# Patient Record
Sex: FEMALE | Race: WHITE | NOT HISPANIC OR LATINO | Employment: UNEMPLOYED
[De-identification: names, ages, dates, MRNs, and addresses within clinical notes are randomized per-mention and may not be internally consistent; named-entity substitution may affect disease eponyms.]

---

## 2017-04-26 ENCOUNTER — HOSPITAL (OUTPATIENT)
Dept: OTHER | Age: 37
End: 2017-04-26
Attending: EMERGENCY MEDICINE

## 2017-06-06 ENCOUNTER — HOSPITAL (OUTPATIENT)
Dept: OTHER | Age: 37
End: 2017-06-06
Attending: FAMILY MEDICINE

## 2019-07-22 ENCOUNTER — HOSPITAL ENCOUNTER (INPATIENT)
Facility: HOSPITAL | Age: 39
LOS: 1 days | Discharge: HOME | DRG: 313 | End: 2019-07-23
Attending: EMERGENCY MEDICINE | Admitting: HOSPITALIST
Payer: MEDICARE

## 2019-07-22 DIAGNOSIS — G89.4 CHRONIC PAIN SYNDROME: ICD-10-CM

## 2019-07-22 DIAGNOSIS — E86.0 DEHYDRATION: ICD-10-CM

## 2019-07-22 DIAGNOSIS — R07.9 CHEST PAIN: Primary | ICD-10-CM

## 2019-07-22 PROBLEM — R94.31 ABNORMAL EKG: Status: ACTIVE | Noted: 2019-07-22

## 2019-07-22 PROBLEM — E87.6 HYPOKALEMIA: Status: ACTIVE | Noted: 2019-07-22

## 2019-07-22 PROBLEM — D72.829 LEUKOCYTOSIS: Status: ACTIVE | Noted: 2019-07-22

## 2019-07-22 PROBLEM — I95.9 HYPOTENSION: Status: ACTIVE | Noted: 2019-07-22

## 2019-07-22 PROBLEM — F17.200 NICOTINE DEPENDENCE: Status: ACTIVE | Noted: 2019-07-22

## 2019-07-22 PROBLEM — F41.9 ANXIETY DISORDER: Status: ACTIVE | Noted: 2019-07-22

## 2019-07-22 LAB
ALBUMIN SERPL BCP-MCNC: 4 G/DL (ref 3.5–5.2)
ALP SERPL-CCNC: 46 U/L (ref 55–135)
ALT SERPL W/O P-5'-P-CCNC: 9 U/L (ref 10–44)
ANION GAP SERPL CALC-SCNC: 8 MMOL/L (ref 8–16)
AST SERPL-CCNC: 11 U/L (ref 10–40)
B-HCG UR QL: NEGATIVE
BASOPHILS # BLD AUTO: 0.05 K/UL (ref 0–0.2)
BASOPHILS NFR BLD: 0.2 % (ref 0–1.9)
BILIRUB SERPL-MCNC: 0.2 MG/DL (ref 0.1–1)
BILIRUB UR QL STRIP: NEGATIVE
BNP SERPL-MCNC: 18 PG/ML (ref 0–99)
BUN SERPL-MCNC: 12 MG/DL (ref 6–20)
CALCIUM SERPL-MCNC: 9.3 MG/DL (ref 8.7–10.5)
CHLORIDE SERPL-SCNC: 109 MMOL/L (ref 95–110)
CLARITY UR: ABNORMAL
CO2 SERPL-SCNC: 23 MMOL/L (ref 23–29)
COLOR UR: YELLOW
CREAT SERPL-MCNC: 0.8 MG/DL (ref 0.5–1.4)
CRP SERPL-MCNC: 0.4 MG/L (ref 0–8.2)
D DIMER PPP IA.FEU-MCNC: <0.19 MG/L FEU
DIFFERENTIAL METHOD: ABNORMAL
EOSINOPHIL # BLD AUTO: 0.2 K/UL (ref 0–0.5)
EOSINOPHIL NFR BLD: 0.9 % (ref 0–8)
ERYTHROCYTE [DISTWIDTH] IN BLOOD BY AUTOMATED COUNT: 12.9 % (ref 11.5–14.5)
ERYTHROCYTE [SEDIMENTATION RATE] IN BLOOD BY WESTERGREN METHOD: 2 MM/HR (ref 0–20)
EST. GFR  (AFRICAN AMERICAN): >60 ML/MIN/1.73 M^2
EST. GFR  (NON AFRICAN AMERICAN): >60 ML/MIN/1.73 M^2
GLUCOSE SERPL-MCNC: 95 MG/DL (ref 70–110)
GLUCOSE UR QL STRIP: NEGATIVE
HCT VFR BLD AUTO: 43.7 % (ref 37–48.5)
HGB BLD-MCNC: 14.5 G/DL (ref 12–16)
HGB UR QL STRIP: NEGATIVE
IMM GRANULOCYTES # BLD AUTO: 0.08 K/UL (ref 0–0.04)
KETONES UR QL STRIP: NEGATIVE
LEUKOCYTE ESTERASE UR QL STRIP: NEGATIVE
LIPASE SERPL-CCNC: 15 U/L (ref 4–60)
LYMPHOCYTES # BLD AUTO: 3 K/UL (ref 1–4.8)
LYMPHOCYTES NFR BLD: 14.5 % (ref 18–48)
MAGNESIUM SERPL-MCNC: 2 MG/DL (ref 1.6–2.6)
MCH RBC QN AUTO: 29.8 PG (ref 27–31)
MCHC RBC AUTO-ENTMCNC: 33.2 G/DL (ref 32–36)
MCV RBC AUTO: 90 FL (ref 82–98)
MONOCYTES # BLD AUTO: 1 K/UL (ref 0.3–1)
MONOCYTES NFR BLD: 4.8 % (ref 4–15)
NEUTROPHILS # BLD AUTO: 16.2 K/UL (ref 1.8–7.7)
NEUTROPHILS NFR BLD: 79.2 % (ref 38–73)
NITRITE UR QL STRIP: NEGATIVE
NRBC BLD-RTO: 0 /100 WBC
PH UR STRIP: 8 [PH] (ref 5–8)
PLATELET # BLD AUTO: 231 K/UL (ref 150–350)
PMV BLD AUTO: 11.6 FL (ref 9.2–12.9)
POTASSIUM SERPL-SCNC: 3.5 MMOL/L (ref 3.5–5.1)
PROT SERPL-MCNC: 6.3 G/DL (ref 6–8.4)
PROT UR QL STRIP: NEGATIVE
RBC # BLD AUTO: 4.87 M/UL (ref 4–5.4)
SODIUM SERPL-SCNC: 140 MMOL/L (ref 136–145)
SP GR UR STRIP: 1.01 (ref 1–1.03)
TROPONIN I SERPL DL<=0.01 NG/ML-MCNC: 0.01 NG/ML (ref 0–0.03)
TROPONIN I SERPL DL<=0.01 NG/ML-MCNC: 0.01 NG/ML (ref 0–0.03)
TROPONIN I SERPL DL<=0.01 NG/ML-MCNC: <0.006 NG/ML (ref 0–0.03)
URN SPEC COLLECT METH UR: ABNORMAL
UROBILINOGEN UR STRIP-ACNC: 1 EU/DL
WBC # BLD AUTO: 20.48 K/UL (ref 3.9–12.7)

## 2019-07-22 PROCEDURE — 99285 EMERGENCY DEPT VISIT HI MDM: CPT | Mod: 25

## 2019-07-22 PROCEDURE — 94761 N-INVAS EAR/PLS OXIMETRY MLT: CPT

## 2019-07-22 PROCEDURE — 85651 RBC SED RATE NONAUTOMATED: CPT

## 2019-07-22 PROCEDURE — 96374 THER/PROPH/DIAG INJ IV PUSH: CPT

## 2019-07-22 PROCEDURE — 25000003 PHARM REV CODE 250: Performed by: NURSE PRACTITIONER

## 2019-07-22 PROCEDURE — 81025 URINE PREGNANCY TEST: CPT

## 2019-07-22 PROCEDURE — 83735 ASSAY OF MAGNESIUM: CPT

## 2019-07-22 PROCEDURE — 93005 ELECTROCARDIOGRAM TRACING: CPT

## 2019-07-22 PROCEDURE — G0378 HOSPITAL OBSERVATION PER HR: HCPCS

## 2019-07-22 PROCEDURE — 85379 FIBRIN DEGRADATION QUANT: CPT

## 2019-07-22 PROCEDURE — 36415 COLL VENOUS BLD VENIPUNCTURE: CPT

## 2019-07-22 PROCEDURE — 25000003 PHARM REV CODE 250: Performed by: EMERGENCY MEDICINE

## 2019-07-22 PROCEDURE — 84484 ASSAY OF TROPONIN QUANT: CPT | Mod: 91

## 2019-07-22 PROCEDURE — 63600175 PHARM REV CODE 636 W HCPCS: Performed by: EMERGENCY MEDICINE

## 2019-07-22 PROCEDURE — 63600175 PHARM REV CODE 636 W HCPCS: Performed by: NURSE PRACTITIONER

## 2019-07-22 PROCEDURE — 83880 ASSAY OF NATRIURETIC PEPTIDE: CPT

## 2019-07-22 PROCEDURE — 80053 COMPREHEN METABOLIC PANEL: CPT

## 2019-07-22 PROCEDURE — 81003 URINALYSIS AUTO W/O SCOPE: CPT

## 2019-07-22 PROCEDURE — 25000003 PHARM REV CODE 250

## 2019-07-22 PROCEDURE — 83690 ASSAY OF LIPASE: CPT

## 2019-07-22 PROCEDURE — 85025 COMPLETE CBC W/AUTO DIFF WBC: CPT

## 2019-07-22 PROCEDURE — 86140 C-REACTIVE PROTEIN: CPT

## 2019-07-22 RX ORDER — CLONAZEPAM 1 MG/1
1 TABLET ORAL 2 TIMES DAILY
Status: DISCONTINUED | OUTPATIENT
Start: 2019-07-22 | End: 2019-07-23 | Stop reason: HOSPADM

## 2019-07-22 RX ORDER — TOPIRAMATE 100 MG/1
400 TABLET, FILM COATED ORAL NIGHTLY
COMMUNITY

## 2019-07-22 RX ORDER — KETOROLAC TROMETHAMINE 30 MG/ML
15 INJECTION, SOLUTION INTRAMUSCULAR; INTRAVENOUS ONCE
Status: COMPLETED | OUTPATIENT
Start: 2019-07-22 | End: 2019-07-22

## 2019-07-22 RX ORDER — PANTOPRAZOLE SODIUM 40 MG/1
40 TABLET, DELAYED RELEASE ORAL DAILY
Status: DISCONTINUED | OUTPATIENT
Start: 2019-07-22 | End: 2019-07-23 | Stop reason: HOSPADM

## 2019-07-22 RX ORDER — TOPIRAMATE 100 MG/1
100 TABLET, FILM COATED ORAL DAILY
COMMUNITY
End: 2019-07-30 | Stop reason: ALTCHOICE

## 2019-07-22 RX ORDER — ONDANSETRON 2 MG/ML
4 INJECTION INTRAMUSCULAR; INTRAVENOUS EVERY 8 HOURS PRN
Status: DISCONTINUED | OUTPATIENT
Start: 2019-07-22 | End: 2019-07-22

## 2019-07-22 RX ORDER — NITROGLYCERIN 0.4 MG/1
0.4 TABLET SUBLINGUAL EVERY 5 MIN PRN
Status: DISCONTINUED | OUTPATIENT
Start: 2019-07-22 | End: 2019-07-23 | Stop reason: HOSPADM

## 2019-07-22 RX ORDER — OXYCODONE HYDROCHLORIDE 30 MG/1
30 TABLET ORAL EVERY 4 HOURS PRN
Status: DISCONTINUED | OUTPATIENT
Start: 2019-07-22 | End: 2019-07-23 | Stop reason: HOSPADM

## 2019-07-22 RX ORDER — NALOXONE HCL 0.4 MG/ML
0.4 VIAL (ML) INJECTION
Status: DISCONTINUED | OUTPATIENT
Start: 2019-07-22 | End: 2019-07-23 | Stop reason: HOSPADM

## 2019-07-22 RX ORDER — SODIUM CHLORIDE 0.9 % (FLUSH) 0.9 %
10 SYRINGE (ML) INJECTION
Status: DISCONTINUED | OUTPATIENT
Start: 2019-07-22 | End: 2019-07-23 | Stop reason: HOSPADM

## 2019-07-22 RX ORDER — NITROGLYCERIN 0.4 MG/1
0.4 TABLET SUBLINGUAL EVERY 5 MIN PRN
Status: DISCONTINUED | OUTPATIENT
Start: 2019-07-22 | End: 2019-07-22

## 2019-07-22 RX ORDER — MORPHINE SULFATE 4 MG/ML
4 INJECTION, SOLUTION INTRAMUSCULAR; INTRAVENOUS
Status: ACTIVE | OUTPATIENT
Start: 2019-07-22 | End: 2019-07-22

## 2019-07-22 RX ORDER — SODIUM CHLORIDE 9 MG/ML
INJECTION, SOLUTION INTRAVENOUS ONCE
Status: COMPLETED | OUTPATIENT
Start: 2019-07-22 | End: 2019-07-22

## 2019-07-22 RX ORDER — ASPIRIN 325 MG
325 TABLET ORAL
Status: COMPLETED | OUTPATIENT
Start: 2019-07-22 | End: 2019-07-22

## 2019-07-22 RX ORDER — POTASSIUM CHLORIDE 20 MEQ/1
40 TABLET, EXTENDED RELEASE ORAL ONCE
Status: COMPLETED | OUTPATIENT
Start: 2019-07-22 | End: 2019-07-22

## 2019-07-22 RX ORDER — TOPIRAMATE 100 MG/1
100 TABLET, FILM COATED ORAL DAILY
Status: DISCONTINUED | OUTPATIENT
Start: 2019-07-23 | End: 2019-07-23 | Stop reason: HOSPADM

## 2019-07-22 RX ORDER — OXYCODONE HYDROCHLORIDE 15 MG/1
30 TABLET ORAL EVERY 4 HOURS PRN
Status: ON HOLD | COMMUNITY
End: 2019-07-23 | Stop reason: SDUPTHER

## 2019-07-22 RX ORDER — AMOXICILLIN 250 MG
1 CAPSULE ORAL 2 TIMES DAILY PRN
Status: DISCONTINUED | OUTPATIENT
Start: 2019-07-22 | End: 2019-07-23 | Stop reason: HOSPADM

## 2019-07-22 RX ORDER — CLONAZEPAM 1 MG/1
1 TABLET ORAL 2 TIMES DAILY
COMMUNITY

## 2019-07-22 RX ORDER — SODIUM CHLORIDE 9 MG/ML
INJECTION, SOLUTION INTRAVENOUS CONTINUOUS
Status: DISCONTINUED | OUTPATIENT
Start: 2019-07-22 | End: 2019-07-23 | Stop reason: HOSPADM

## 2019-07-22 RX ORDER — OXYCODONE HYDROCHLORIDE 30 MG/1
30 TABLET, FILM COATED, EXTENDED RELEASE ORAL EVERY 12 HOURS
COMMUNITY

## 2019-07-22 RX ORDER — ACETAMINOPHEN 325 MG/1
650 TABLET ORAL EVERY 6 HOURS PRN
Status: DISCONTINUED | OUTPATIENT
Start: 2019-07-22 | End: 2019-07-23 | Stop reason: HOSPADM

## 2019-07-22 RX ORDER — TOPIRAMATE 100 MG/1
300 TABLET, FILM COATED ORAL NIGHTLY
Status: DISCONTINUED | OUTPATIENT
Start: 2019-07-22 | End: 2019-07-23 | Stop reason: HOSPADM

## 2019-07-22 RX ORDER — ACETAMINOPHEN 325 MG/1
650 TABLET ORAL EVERY 6 HOURS PRN
Status: DISCONTINUED | OUTPATIENT
Start: 2019-07-22 | End: 2019-07-22

## 2019-07-22 RX ORDER — ONDANSETRON 2 MG/ML
4 INJECTION INTRAMUSCULAR; INTRAVENOUS EVERY 4 HOURS PRN
Status: DISCONTINUED | OUTPATIENT
Start: 2019-07-22 | End: 2019-07-23 | Stop reason: HOSPADM

## 2019-07-22 RX ORDER — ASPIRIN 325 MG
325 TABLET ORAL DAILY
Status: DISCONTINUED | OUTPATIENT
Start: 2019-07-23 | End: 2019-07-23 | Stop reason: HOSPADM

## 2019-07-22 RX ADMIN — NITROGLYCERIN 0.4 MG: 0.4 TABLET, ORALLY DISINTEGRATING SUBLINGUAL at 06:07

## 2019-07-22 RX ADMIN — NITROGLYCERIN 0.4 MG: 0.4 TABLET, ORALLY DISINTEGRATING SUBLINGUAL at 07:07

## 2019-07-22 RX ADMIN — SODIUM CHLORIDE: 0.9 INJECTION, SOLUTION INTRAVENOUS at 01:07

## 2019-07-22 RX ADMIN — PANTOPRAZOLE SODIUM 40 MG: 40 TABLET, DELAYED RELEASE ORAL at 10:07

## 2019-07-22 RX ADMIN — LIDOCAINE HYDROCHLORIDE: 20 SOLUTION ORAL; TOPICAL at 05:07

## 2019-07-22 RX ADMIN — ASPIRIN 325 MG ORAL TABLET 325 MG: 325 PILL ORAL at 05:07

## 2019-07-22 RX ADMIN — KETOROLAC TROMETHAMINE 15 MG: 30 INJECTION, SOLUTION INTRAMUSCULAR; INTRAVENOUS at 10:07

## 2019-07-22 RX ADMIN — SODIUM CHLORIDE 500 ML: 0.9 INJECTION, SOLUTION INTRAVENOUS at 10:07

## 2019-07-22 RX ADMIN — TOPIRAMATE 300 MG: 100 TABLET, FILM COATED ORAL at 10:07

## 2019-07-22 RX ADMIN — SODIUM CHLORIDE: 0.9 INJECTION, SOLUTION INTRAVENOUS at 10:07

## 2019-07-22 RX ADMIN — POTASSIUM CHLORIDE 40 MEQ: 20 TABLET, EXTENDED RELEASE ORAL at 10:07

## 2019-07-22 NOTE — [facility] SECONDARY REVIEW
Physician Advisor Internal    Level of Care Issue    Approved Inpatient for admit 7/22/19 per prosper hammonds md reviewer.

## 2019-07-22 NOTE — ED NOTES
Pt presents with complaints of chest pain that is mid chest and radiates down a little. Pt denies any shortness of breath. Pain is described as sharp and started appx a little over an hour ago. Pt initially thought maybe it was indigestion and vomited times one with no relief. No diaphoresis. Pain level is 7/10. Lungs clear to auscultation. Pt denies any cardiac history and denies any recent illnesses. Pt alert and oriented with neuro intact.

## 2019-07-22 NOTE — SUBJECTIVE & OBJECTIVE
Past Medical History:   Diagnosis Date    Mass     couple masses on left occipital    Migraine        Past Surgical History:   Procedure Laterality Date    APPENDECTOMY       SECTION      KNEE SURGERY Bilateral     had growths bilateral knees removed, some internal/some external beneign    TONSILLECTOMY         Review of patient's allergies indicates:   Allergen Reactions    Vasopressin analogues Other (See Comments)     Constricts chest then pass out       No current facility-administered medications on file prior to encounter.      Current Outpatient Medications on File Prior to Encounter   Medication Sig    clonazePAM (KLONOPIN) 1 MG tablet Take 1 mg by mouth 2 (two) times daily.    erenumab-aooe (AIMOVIG AUTOINJECTOR, 2 PACK,) 70 mg/mL injection Inject 70 mg into the skin every 28 days.    oxyCODONE (OXYCONTIN) 30 mg TR12 12 hr tablet Take 30 mg by mouth every 12 (twelve) hours.    oxyCODONE (ROXICODONE) 15 MG Tab Take 30 mg by mouth every 4 (four) hours as needed for Pain.    topiramate (TOPAMAX) 100 MG tablet Take 100 mg by mouth once daily.    topiramate (TOPAMAX) 100 MG tablet Take 300 mg by mouth every evening.     Family History     Problem Relation (Age of Onset)    Cancer Mother, Maternal Grandmother    Cerebral palsy Brother    Hypertension Mother    No Known Problems Father, Brother        Tobacco Use    Smoking status: Current Some Day Smoker     Packs/day: 0.50    Smokeless tobacco: Never Used   Substance and Sexual Activity    Alcohol use: Not Currently    Drug use: Never    Sexual activity: Not on file     Review of Systems   Constitutional: Positive for activity change, appetite change and fatigue. Negative for chills and fever.   HENT: Negative for ear discharge, ear pain, facial swelling and hearing loss.         Patient reports chronic bilateral temple headache daily that radiates to the bottom of her neck.   Eyes: Negative for pain and redness.   Respiratory:  Negative for cough and shortness of breath.    Cardiovascular: Positive for chest pain. Negative for palpitations and leg swelling.        Patient reports she woke up this morning around 4:00 a.m. With severe left-sided hip chest discomfort being sharp or stabbing light of 7 to 8/10.  Patient reports increased pain with deep inspiration and changes in position.      Gastrointestinal: Positive for nausea. Negative for abdominal distention, abdominal pain, blood in stool, constipation, diarrhea and vomiting.   Endocrine: Negative for polydipsia and polyphagia.   Genitourinary: Negative for difficulty urinating, dysuria, flank pain and hematuria.   Musculoskeletal: Negative for gait problem, neck pain and neck stiffness.   Skin: Negative for color change.   Allergic/Immunologic: Negative for food allergies.   Neurological: Negative for seizures, syncope, facial asymmetry, speech difficulty and weakness.        Leg cramps   Hematological: Does not bruise/bleed easily.   Psychiatric/Behavioral: Negative for agitation, behavioral problems, confusion, hallucinations and suicidal ideas. The patient is not nervous/anxious.      Objective:     Vital Signs (Most Recent):  Temp: 98.7 °F (37.1 °C) (07/22/19 1050)  Pulse: 67 (07/22/19 1050)  Resp: 18 (07/22/19 1050)  BP: (!) 103/54 (07/22/19 1050)  SpO2: 98 % (07/22/19 1050) Vital Signs (24h Range):  Temp:  [98.6 °F (37 °C)-98.7 °F (37.1 °C)] 98.7 °F (37.1 °C)  Pulse:  [] 67  Resp:  [16-21] 18  SpO2:  [96 %-100 %] 98 %  BP: ()/(51-63) 103/54     Weight: 49.9 kg (110 lb)  Body mass index is 21.48 kg/m².    Physical Exam   Constitutional: She is oriented to person, place, and time. She appears well-developed and well-nourished. No distress.   Sleepy but arousable   HENT:   Head: Normocephalic and atraumatic.   Eyes: Pupils are equal, round, and reactive to light. Conjunctivae and EOM are normal. Right eye exhibits no discharge. Left eye exhibits no discharge.   Neck:  Normal range of motion. Neck supple. No JVD present.   Cardiovascular: Normal rate, regular rhythm, normal heart sounds and intact distal pulses.   No murmur heard.  Pulmonary/Chest: Effort normal and breath sounds normal. No respiratory distress. She has no wheezes.   Abdominal: Soft. Bowel sounds are normal. She exhibits no distension. There is no tenderness. There is no guarding.   Genitourinary:   Genitourinary Comments: Not examined   Musculoskeletal: Normal range of motion. She exhibits no edema.   Neurological: She is alert and oriented to person, place, and time. No cranial nerve deficit.   Skin: Skin is warm and dry. Capillary refill takes less than 2 seconds. She is not diaphoretic.   Psychiatric: She has a normal mood and affect. Her behavior is normal. Judgment and thought content normal.         CRANIAL NERVES     CN III, IV, VI   Pupils are equal, round, and reactive to light.  Extraocular motions are normal.        Significant Labs: Reviewed    Significant Imaging: Reviewed

## 2019-07-22 NOTE — H&P
[facility] Hospital Medicine  History & Physical    Patient Name: Ivanna Mary  MRN: [id number]  Admission Date: 2019  Attending Physician: VIRGIL Haque  Primary Care Provider: Primary Doctor No    Patient information was obtained from patient, Father at bedside and ER records.     Subjective:     Principal Problem:Chest pain    Chief Complaint:   Chief Complaint   Patient presents with    Chest Pain     x 1 hour        HPI: This is a 39-year-old woman with a history of anxiety disorder, migraine headache, rheumatoid arthritis, and tobacco abuse presents emergency department for evaluation of sudden onset of constant substernal chest pain without radiation and with associated nausea that awoke her from sleep.  The symptoms have lasted approximately 1.5 hr in her still ongoing.  She has no associated shortness of breath.  Does not take birth control.  Has no family history of ACS or heart attacks.  The patient was placed in the hospital for further evaluation treatment.    Patient reports she woke up this morning around 4:00 a.m. with severe left-sided hip chest discomfort being sharp or stabbing light of 7 to 8/10.  Patient reports increased pain with deep inspiration and changes in position.  Patient reports she has a history of migraine headaches and self administers Aimovig  for her migraine headaches and took a dose yesterday.  She reports sometimes after taking it she has nausea and hot flashes.    Past Medical History:   Diagnosis Date    Mass     couple masses on left occipital    Migraine        Past Surgical History:   Procedure Laterality Date    APPENDECTOMY       SECTION      KNEE SURGERY Bilateral     had growths bilateral knees removed, some internal/some external beneign    TONSILLECTOMY         Review of patient's allergies indicates:   Allergen Reactions    Vasopressin analogues Other (See Comments)     Constricts chest then pass out       No  current facility-administered medications on file prior to encounter.      Current Outpatient Medications on File Prior to Encounter   Medication Sig    clonazePAM (KLONOPIN) 1 MG tablet Take 1 mg by mouth 2 (two) times daily.    erenumab-aooe (AIMOVIG AUTOINJECTOR, 2 PACK,) 70 mg/mL injection Inject 70 mg into the skin every 28 days.    oxyCODONE (OXYCONTIN) 30 mg TR12 12 hr tablet Take 30 mg by mouth every 12 (twelve) hours.    oxyCODONE (ROXICODONE) 15 MG Tab Take 30 mg by mouth every 4 (four) hours as needed for Pain.    topiramate (TOPAMAX) 100 MG tablet Take 100 mg by mouth once daily.    topiramate (TOPAMAX) 100 MG tablet Take 300 mg by mouth every evening.     Family History     Problem Relation (Age of Onset)    Cancer Mother, Maternal Grandmother    Cerebral palsy Brother    Hypertension Mother    No Known Problems Father, Brother        Tobacco Use    Smoking status: Current Some Day Smoker     Packs/day: 0.50    Smokeless tobacco: Never Used   Substance and Sexual Activity    Alcohol use: Not Currently    Drug use: Never    Sexual activity: Not on file     Review of Systems   Constitutional: Positive for activity change, appetite change and fatigue. Negative for chills and fever.   HENT: Negative for ear discharge, ear pain, facial swelling and hearing loss.         Patient reports chronic bilateral temple headache daily that radiates to the bottom of her neck.   Eyes: Negative for pain and redness.   Respiratory: Negative for cough and shortness of breath.    Cardiovascular: Positive for chest pain. Negative for palpitations and leg swelling.        Patient reports she woke up this morning around 4:00 a.m. With severe left-sided hip chest discomfort being sharp or stabbing light of 7 to 8/10.  Patient reports increased pain with deep inspiration and changes in position.      Gastrointestinal: Positive for nausea. Negative for abdominal distention, abdominal pain, blood in stool, constipation,  diarrhea and vomiting.   Endocrine: Negative for polydipsia and polyphagia.   Genitourinary: Negative for difficulty urinating, dysuria, flank pain and hematuria.   Musculoskeletal: Negative for gait problem, neck pain and neck stiffness.   Skin: Negative for color change.   Allergic/Immunologic: Negative for food allergies.   Neurological: Negative for seizures, syncope, facial asymmetry, speech difficulty and weakness.        Leg cramps   Hematological: Does not bruise/bleed easily.   Psychiatric/Behavioral: Negative for agitation, behavioral problems, confusion, hallucinations and suicidal ideas. The patient is not nervous/anxious.      Objective:     Vital Signs (Most Recent):  Temp: 98.7 °F (37.1 °C) (07/22/19 1050)  Pulse: 67 (07/22/19 1050)  Resp: 18 (07/22/19 1050)  BP: (!) 103/54 (07/22/19 1050)  SpO2: 98 % (07/22/19 1050) Vital Signs (24h Range):  Temp:  [98.6 °F (37 °C)-98.7 °F (37.1 °C)] 98.7 °F (37.1 °C)  Pulse:  [] 67  Resp:  [16-21] 18  SpO2:  [96 %-100 %] 98 %  BP: ()/(51-63) 103/54     Weight: 49.9 kg (110 lb)  Body mass index is 21.48 kg/m².    Physical Exam   Constitutional: She is oriented to person, place, and time. She appears well-developed and well-nourished. No distress.   Sleepy but arousable   HENT:   Head: Normocephalic and atraumatic.   Eyes: Pupils are equal, round, and reactive to light. Conjunctivae and EOM are normal. Right eye exhibits no discharge. Left eye exhibits no discharge.   Neck: Normal range of motion. Neck supple. No JVD present.   Cardiovascular: Normal rate, regular rhythm, normal heart sounds and intact distal pulses.   No murmur heard.  Pulmonary/Chest: Effort normal and breath sounds normal. No respiratory distress. She has no wheezes.   Abdominal: Soft. Bowel sounds are normal. She exhibits no distension. There is no tenderness. There is no guarding.   Genitourinary:   Genitourinary Comments: Not examined   Musculoskeletal: Normal range of motion. She  exhibits no edema.   Neurological: She is alert and oriented to person, place, and time. No cranial nerve deficit.   Skin: Skin is warm and dry. Capillary refill takes less than 2 seconds. She is not diaphoretic.   Psychiatric: She has a normal mood and affect. Her behavior is normal. Judgment and thought content normal.         CRANIAL NERVES     CN III, IV, VI   Pupils are equal, round, and reactive to light.  Extraocular motions are normal.        Significant Labs: Reviewed    Significant Imaging: Reviewed    Assessment/Plan:     * Chest pain  Abnormal EKG-   Telemetry  Cardiology consulted- check echocardiogram  Trend troponins  Patient received full-dose aspirin, add Protonix, add dose Toradol  Elevated WBCs with complaints of nausea-check gallbladder ultrasound  Patient remains NPO at this time for further testing- add IVF  Check lipase level  P.r.n. pain and antiemetic medication    Chronic pain with chronic pain syndrome-  Home oxycodone dose verified and p.r.n. dosage resumed     Hypokalemia  Monitor  Supplement as needed  Check magnesium level      Leukocytosis  Monitor  Check UA  Check gallbladder ultrasound  Check sed rate and CRP      Hypotension  Monitor   Add IV fluid bolus followed by IV fluid administration      Nicotine dependence  Smoking cessation education      Anxiety disorder  History of  Monitor        VTE Risk Mitigation (From admission, onward)        Ordered     IP VTE LOW RISK PATIENT  Once      07/22/19 0930     Place MIHIR hose  Until discontinued      07/22/19 0930             VIRGIL Haque  Department of Hospital Medicine   [facility]    Time spent seeing patient( greater than 1/2 spent in direct contact) :  83 minutes

## 2019-07-22 NOTE — CONSULTS
"HISTORY OF PRESENT ILLNESS:  This 39-year-old -American lady is admitted   with chest discomfort.    The patient awoke at 4:00 a.m. this morning with severe sharp stabbing left   parasternal and precordial discomfort that continues at present.  The pain is   aggravated by deep breathing and movement.  She also reports pain in the root of   the right neck if she takes a deep breath.  Otherwise, she has no pain in the   neck.  She felt a little bloated; she induced vomiting hoping to relieve   symptoms, but to no avail.  She denies nausea.  There is no diaphoresis,   discomfort in the throat, shoulder or arms.  She has never had similar symptoms   before.    PAST MEDICAL HISTORY:  The patient has severe migraines for many years now.  She   is on Aimovig monthly.  She has been taking these monthly injections for   approximately 5 to 6 months; she took a shot yesterday.  She sometimes does get   nausea with Aimovig.    There is no history of hypertension or diabetes mellitus.  She was diagnosed   with rheumatoid arthritis four years ago and was treated with chloroquine for   approximately 1 year.  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    SOCIAL HISTORY:  The patient has been a half pack per day smoker since around   age 19.  She does not drink any alcohol.    FAMILY HISTORY:  There is no history of heart disease in the family.    REVIEW OF SYSTEMS:  The patient denies dysphagia.  She has occasional heartburn   and reflux.  There is no history of hematemesis, hematochezia or melena.  She   reports arthralgias and nodules that pop up the elbows and the MPJ in the hands.    MEDICATIONS:  Clonazepam 1 mg b.i.d., OxyContin 30 every 12 hours ER, OxyContin   30 mg every 4 hours p.r.n. for pain, topiramate 100 mg daily and 300 mg in the   evening.  Aimovig monthly (erenumab-aooe).    PHYSICAL " EXAMINATION:  GENERAL:  This is a well-built -American lady in no acute distress.  She   appears to be a little drowsy.  VITAL SIGNS:  Blood pressure 103/54, 18 per minute, 67 per minute, 98.7 degrees   F.  EYES:  No conjunctival pallor or scleral icterus.  THROAT:  No masses are observed.  NECK:  Carotids equal without bruits.  There is no jugular venous distention or   thyromegaly.  CHEST:  Air entry is equal bilaterally.  There were no rales or rhonchi.  HEART:  S1, S2 were muffled.  There were no murmurs, gallops or rubs.  There is   mild tenderness on palpation of the left costochondral junctions -- possible   reproduction of pain.  ABDOMEN:  Soft, mild tenderness in the epigastrium.  Liver edge is not palpable.  EXTREMITIES:  No clubbing, cyanosis or edema.  Dorsalis pedis and posterior   tibial pulses are felt bilaterally.    ECG reveals sinus rhythm.  There is T-wave inversion in V3, biphasic T-wave in   V4.    LABORATORY DATA:  Hemoglobin 14.5, hematocrit 23.7, WBC count is 20,048.    Granulocytes 79%, lymphocytes 15%, immature granulocytes 0.08%.  D-dimer less   than 0.19.  CRP 0.4.  Sodium 140, potassium 3.5, BUN 12, creatinine 0.8, total   protein 6.3, albumin 4.0.  AST and ALT are within normal limits.  BNP 18.    Troponin 0.09 and 0.006.    IMPRESSION:  1.  Chest discomfort -- pleuritic pain; possible pericarditis.  2.  Rheumatoid arthritis.  3.  Intractable migraine.  4.  Leukocytosis.    I doubt the patient has ACS.  The pain is clearly pleuritic.  She could have   pericarditis or pleurisy with the history of rheumatoid arthritis.  She has   marked leukocytosis.  Echocardiogram and EKG will be repeated.  Further   management will depend on the patient's clinical course.  CRP is incidentally   normal.    Thank you for asking me to evaluate Ms. Mary and I will follow her along with   you.      MT/IN  dd: 07/22/2019 12:13:23 (CDT)  td: 07/22/2019 14:28:11 (CDT)  Doc ID   #[id number]  Job ID  #[id number]    CC:

## 2019-07-22 NOTE — ASSESSMENT & PLAN NOTE
Abnormal EKG-   Telemetry  Cardiology consulted- check echocardiogram  Trend troponins  Patient received full-dose aspirin, add Protonix, add dose Toradol  Elevated WBCs with complaints of nausea-check gallbladder ultrasound  Patient remains NPO at this time for further testing- add IVF  Check lipase level  P.r.n. pain and antiemetic medication
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
## 2019-07-22 NOTE — HPI
This is a 39-year-old woman with a history of anxiety disorder, migraine headache, rheumatoid arthritis, and tobacco abuse presents emergency department for evaluation of sudden onset of constant substernal chest pain without radiation and with associated nausea that awoke her from sleep.  The symptoms have lasted approximately 1.5 hr in her still ongoing.  She has no associated shortness of breath.  Does not take birth control.  Has no family history of ACS or heart attacks. The patient was placed in the hospital for further evaluation treatment.    Patient reports she woke up this morning around 4:00 a.m. with severe left-sided hip chest discomfort being sharp or stabbing light of 7 to 8/10.  Patient reports increased pain with deep inspiration and changes in position.  Patient reports she has a history of migraine headaches and self administers Aimovig  for her migraine headaches and took a dose yesterday.  She reports sometimes after taking it she has nausea and hot flashes.

## 2019-07-22 NOTE — ED PROVIDER NOTES
Encounter Date: 7/22/2019       History     Chief Complaint   Patient presents with    Chest Pain     x 1 hour     HPI   39-year-old woman with a history of anxiety and tobacco abuse presents emergency department for evaluation of sudden onset of constant substernal chest pain without radiation and with associated nausea that awoke her from sleep.  The symptoms have lasted approximately 1.5 hr in her still ongoing.  She has no associated shortness of breath.  Does not take birth control.  Has no family history of ACS or heart attacks.   Review of patient's allergies indicates:   Allergen Reactions    Vasopressin analogues Other (See Comments)     No past medical history on file.  No past surgical history on file.  No family history on file.  Social History     Tobacco Use    Smoking status: Not on file   Substance Use Topics    Alcohol use: Not on file    Drug use: Not on file     Review of Systems  REVIEW OF SYSTEMS  CONSTITUTIONAL: Negative for fever.  HEENT:  Negative for sore throat.   HEART:   Positive for chest pain.  LUNG:  Negative for shortness of breath.  ABDOMEN:  Negative for nausea.   :  No discharge, dysuria  EXTREMITIES:  No swelling  NEURO:  Negative for weakness.   SKIN:  Negative for rash.  Psych: No depression  HEME: Does not bruise/bleed easily.           Physical Exam     Initial Vitals [07/22/19 0505]   BP Pulse Resp Temp SpO2   (!) 116/59 99 (!) 21 98.6 °F (37 °C) 100 %      MAP       --         Physical Exam  Physical Exam   Nurses notes and vitals reviewed.  Constitutional:Oriented to person, place, and time. Appears well-developed and well-nourished and in no acute distress. Answering all questions appropriate   HEENT: PERRL, EOMI, Conjunctivae are normal. Moist mucous membranes. Normocephalic. Atraumatic, no acute, traumatic or infectious process noted.  Neck: Normal range of motion, supple, no JVD.  Cardiovascular: Normal rate, regular rhythm, normal heart sounds and intact distal  pulses.   Pulmonary/Chest: Effort normal and breath sounds normal. No respiratory distress. No wheezes,  rales or ronchi.   Abdominal: Soft, no distension. There is no tenderness, rebound or gaurding.  No Shukla's, Rovsing's, or McBurney's point tenderness.  No CVA tenderness.   Back:  No midline TTP; no acute abnormal, traumatic or infectious process noted  Musculoskeletal: Moves all extremities well.  Full range of motion.  5/5 strength.  No sensory deficits.  No C/C/E.  2+ pulses throughout  Neurological: Alert and oriented to person, place, and time. Cranial nerves II through XII are grossly intact without anyfocal motor, sensory, and cerebellar findings.  Skin: Skin is warm and dry, no rashes.  Psych: Full affect, cooperative    EKG interpreted by myself shows normal sinus rhythm 80 beats per minute.  There is T-wave inversion in the anterior leads concerning for anterior ischemia.  ST segments are normal. No STEMI.  ED Course   Procedures  Labs Reviewed   CBC W/ AUTO DIFFERENTIAL - Abnormal; Notable for the following components:       Result Value    WBC 20.48 (*)     Gran # (ANC) 16.2 (*)     Immature Grans (Abs) 0.08 (*)     Gran% 79.2 (*)     Lymph% 14.5 (*)     All other components within normal limits   COMPREHENSIVE METABOLIC PANEL - Abnormal; Notable for the following components:    Alkaline Phosphatase 46 (*)     ALT 9 (*)     All other components within normal limits   TROPONIN I   B-TYPE NATRIURETIC PEPTIDE   TROPONIN I   POCT URINE PREGNANCY          Imaging Results          X-Ray Chest PA And Lateral (In process)                  Medical Decision Making:   History:   Old Medical Records: I decided to obtain old medical records.  Initial Assessment:   39-year-old woman presents to the emergency department for evaluation of sudden onset of substernal chest pain without radiation but with associated nausea that awoke her from sleep 1 hr prior to arrival.  She EKG shows anterior T-wave inversions.  No  previous EKG to compare to.  Initial troponins negative. Patient leukocytosis of 20 K but without infectious etiology suspected.  Chest x-ray is negative.  Perc negative for pulmonary embolism.  Patient with improvement in pain from 7/10 to 3/10 with nitroglycerin sublingually.  Discussed with the hospitalist who agrees to observe the patient to trend troponins to rule out ACS.  Probable esophagitis/gastritis.                      Clinical Impression:       ICD-10-CM ICD-9-CM   1. Chest pain R07.9 786.50                                Rolando Muller MD  07/22/19 0739

## 2019-07-22 NOTE — CONSULTS
Dictated.   Chest pain  - pleuritic.  Doubt ACS. ? Pericarditis / pleurisy. H/o RA. Nodules over the patella.. CRP normal.  Migraine. Leukocytosis.  No drug use. Neg FH for heart disease.

## 2019-07-23 VITALS
SYSTOLIC BLOOD PRESSURE: 101 MMHG | HEIGHT: 60 IN | RESPIRATION RATE: 18 BRPM | WEIGHT: 110 LBS | OXYGEN SATURATION: 94 % | DIASTOLIC BLOOD PRESSURE: 54 MMHG | HEART RATE: 54 BPM | TEMPERATURE: 96 F | BODY MASS INDEX: 21.6 KG/M2

## 2019-07-23 PROBLEM — E86.0 DEHYDRATION: Status: RESOLVED | Noted: 2019-07-23 | Resolved: 2019-07-23

## 2019-07-23 PROBLEM — I95.9 HYPOTENSION: Status: RESOLVED | Noted: 2019-07-22 | Resolved: 2019-07-23

## 2019-07-23 PROBLEM — D72.829 LEUKOCYTOSIS: Status: RESOLVED | Noted: 2019-07-22 | Resolved: 2019-07-23

## 2019-07-23 PROBLEM — R07.9 CHEST PAIN: Status: RESOLVED | Noted: 2019-07-22 | Resolved: 2019-07-23

## 2019-07-23 PROBLEM — E86.0 DEHYDRATION: Status: ACTIVE | Noted: 2019-07-23

## 2019-07-23 LAB
ALBUMIN SERPL BCP-MCNC: 3 G/DL (ref 3.5–5.2)
ALP SERPL-CCNC: 42 U/L (ref 55–135)
ALT SERPL W/O P-5'-P-CCNC: 10 U/L (ref 10–44)
ANION GAP SERPL CALC-SCNC: 3 MMOL/L (ref 8–16)
AST SERPL-CCNC: 12 U/L (ref 10–40)
BASOPHILS # BLD AUTO: 0.04 K/UL (ref 0–0.2)
BASOPHILS NFR BLD: 0.4 % (ref 0–1.9)
BILIRUB SERPL-MCNC: 0.2 MG/DL (ref 0.1–1)
BUN SERPL-MCNC: 9 MG/DL (ref 6–20)
CALCIUM SERPL-MCNC: 8.3 MG/DL (ref 8.7–10.5)
CHLORIDE SERPL-SCNC: 115 MMOL/L (ref 95–110)
CO2 SERPL-SCNC: 21 MMOL/L (ref 23–29)
CREAT SERPL-MCNC: 0.7 MG/DL (ref 0.5–1.4)
DIFFERENTIAL METHOD: NORMAL
EOSINOPHIL # BLD AUTO: 0.2 K/UL (ref 0–0.5)
EOSINOPHIL NFR BLD: 1.7 % (ref 0–8)
ERYTHROCYTE [DISTWIDTH] IN BLOOD BY AUTOMATED COUNT: 13.3 % (ref 11.5–14.5)
EST. GFR  (AFRICAN AMERICAN): >60 ML/MIN/1.73 M^2
EST. GFR  (NON AFRICAN AMERICAN): >60 ML/MIN/1.73 M^2
GLUCOSE SERPL-MCNC: 89 MG/DL (ref 70–110)
HCT VFR BLD AUTO: 38.1 % (ref 37–48.5)
HGB BLD-MCNC: 12.2 G/DL (ref 12–16)
IMM GRANULOCYTES # BLD AUTO: 0.03 K/UL (ref 0–0.04)
LYMPHOCYTES # BLD AUTO: 3 K/UL (ref 1–4.8)
LYMPHOCYTES NFR BLD: 29 % (ref 18–48)
MAGNESIUM SERPL-MCNC: 1.9 MG/DL (ref 1.6–2.6)
MCH RBC QN AUTO: 29.8 PG (ref 27–31)
MCHC RBC AUTO-ENTMCNC: 32 G/DL (ref 32–36)
MCV RBC AUTO: 93 FL (ref 82–98)
MONOCYTES # BLD AUTO: 0.8 K/UL (ref 0.3–1)
MONOCYTES NFR BLD: 7.6 % (ref 4–15)
NEUTROPHILS # BLD AUTO: 6.3 K/UL (ref 1.8–7.7)
NEUTROPHILS NFR BLD: 61 % (ref 38–73)
NRBC BLD-RTO: 0 /100 WBC
PLATELET # BLD AUTO: 187 K/UL (ref 150–350)
PMV BLD AUTO: 12.5 FL (ref 9.2–12.9)
POTASSIUM SERPL-SCNC: 4.2 MMOL/L (ref 3.5–5.1)
PROT SERPL-MCNC: 5.1 G/DL (ref 6–8.4)
RBC # BLD AUTO: 4.09 M/UL (ref 4–5.4)
SODIUM SERPL-SCNC: 139 MMOL/L (ref 136–145)
WBC # BLD AUTO: 10.38 K/UL (ref 3.9–12.7)

## 2019-07-23 PROCEDURE — 83735 ASSAY OF MAGNESIUM: CPT

## 2019-07-23 PROCEDURE — 85025 COMPLETE CBC W/AUTO DIFF WBC: CPT

## 2019-07-23 PROCEDURE — 25000003 PHARM REV CODE 250: Performed by: NURSE PRACTITIONER

## 2019-07-23 PROCEDURE — 25000003 PHARM REV CODE 250: Performed by: HOSPITALIST

## 2019-07-23 PROCEDURE — 93005 ELECTROCARDIOGRAM TRACING: CPT

## 2019-07-23 PROCEDURE — 94761 N-INVAS EAR/PLS OXIMETRY MLT: CPT

## 2019-07-23 PROCEDURE — 12000002 HC ACUTE/MED SURGE SEMI-PRIVATE ROOM

## 2019-07-23 PROCEDURE — 25000003 PHARM REV CODE 250

## 2019-07-23 PROCEDURE — 80053 COMPREHEN METABOLIC PANEL: CPT

## 2019-07-23 PROCEDURE — 36415 COLL VENOUS BLD VENIPUNCTURE: CPT

## 2019-07-23 RX ORDER — TIZANIDINE 4 MG/1
4 TABLET ORAL 3 TIMES DAILY PRN
COMMUNITY

## 2019-07-23 RX ORDER — DOCUSATE SODIUM 100 MG/1
100 CAPSULE, LIQUID FILLED ORAL 2 TIMES DAILY PRN
Status: DISCONTINUED | OUTPATIENT
Start: 2019-07-23 | End: 2019-07-23 | Stop reason: HOSPADM

## 2019-07-23 RX ORDER — SODIUM CHLORIDE 9 MG/ML
INJECTION, SOLUTION INTRAVENOUS ONCE
Status: COMPLETED | OUTPATIENT
Start: 2019-07-23 | End: 2019-07-23

## 2019-07-23 RX ORDER — OXYCODONE HYDROCHLORIDE 15 MG/1
30 TABLET ORAL EVERY 6 HOURS PRN
Refills: 0
Start: 2019-07-23 | End: 2019-07-30 | Stop reason: ALTCHOICE

## 2019-07-23 RX ORDER — PANTOPRAZOLE SODIUM 40 MG/1
40 TABLET, DELAYED RELEASE ORAL DAILY
Qty: 14 TABLET | Refills: 0 | Status: SHIPPED | OUTPATIENT
Start: 2019-07-24 | End: 2019-08-07

## 2019-07-23 RX ADMIN — SODIUM CHLORIDE 500 ML: 0.9 INJECTION, SOLUTION INTRAVENOUS at 10:07

## 2019-07-23 RX ADMIN — CLONAZEPAM 1 MG: 1 TABLET ORAL at 10:07

## 2019-07-23 RX ADMIN — ASPIRIN 325 MG ORAL TABLET 325 MG: 325 PILL ORAL at 10:07

## 2019-07-23 RX ADMIN — PANTOPRAZOLE SODIUM 40 MG: 40 TABLET, DELAYED RELEASE ORAL at 10:07

## 2019-07-23 RX ADMIN — TOPIRAMATE 100 MG: 100 TABLET, FILM COATED ORAL at 10:07

## 2019-07-23 RX ADMIN — OXYCODONE HYDROCHLORIDE 30 MG: 30 TABLET ORAL at 10:07

## 2019-07-23 NOTE — PLAN OF CARE
CM met with pt and pt's father, Alton, at bedside. Pt verified information as correct on facesheet. Pt appears to be very agitated and hesitant to complete discharge assessment; assessment completed with pt's father. Pt's father reports that she lives at listed address in [place], but currently is visiting and staying with him for a while. Once DC pt will return to [address] [facility] [address] in [place]. Pt states her PCP is Dr. Butcher in [place] (does not want CM to make follow up appt with local PCP). Pharm for local will be [facility] on [address]. Denies any  services. DME- nebulizer. Pt reports being independent with all ADLs and drives herself to appts. DC plan is home with no needs. CM following     07/23/19 0908   Discharge Assessment   Assessment Type Discharge Planning Assessment   Confirmed/corrected address and phone number on facesheet? Yes   Assessment information obtained from? Patient   Communicated expected length of stay with patient/caregiver yes   Prior to hospitilization cognitive status: Alert/Oriented   Prior to hospitalization functional status: Independent   Current cognitive status: Alert/Oriented   Current Functional Status: Independent   Lives With parent(s)   Able to Return to Prior Arrangements yes   Is patient able to care for self after discharge? Yes   Patient's perception of discharge disposition home or selfcare   Readmission Within the Last 30 Days no previous admission in last 30 days   Patient currently being followed by outpatient case management? No   Patient currently receives home health services? No   Patient currently receives any other outside agency services? No   Equipment Currently Used at Home nebulizer   Do you have any problems affording any of your prescribed medications? No   Is the patient taking medications as prescribed? yes   Does the patient have transportation home? Yes   Transportation Anticipated family or friend will provide   Does the patient receive  services at the [facility]? No   Discharge Plan A Home   DME Needed Upon Discharge  none   Patient/Family in Agreement with Plan yes

## 2019-07-23 NOTE — PROGRESS NOTES
Abdominal ultrasound report discussed with Dr. Lisa.  Patient has no  symptoms whatsoever.  She remains afebrile with no  history, no signs of urinary tract infection, and no signs of acute urinary issues.  Will have patient follow up with PCP as outpatient after discharge.

## 2019-07-23 NOTE — PLAN OF CARE
07/23/19 0744   PRE-TX-O2   O2 Device (Oxygen Therapy) room air   SpO2 100 %   Pulse Oximetry Type Intermittent   $ Pulse Oximetry - Multiple Charge Pulse Oximetry - Multiple   Pulse 61   Resp 16

## 2019-07-23 NOTE — PLAN OF CARE
07/23/19 1243   Final Note   Assessment Type Final Discharge Note   Anticipated Discharge Disposition Home   Hospital Follow Up  Appt(s) scheduled? Yes

## 2019-07-23 NOTE — PLAN OF CARE
Pt now agreeable with seeing local PCP. appt scheduled. AVS updated.     07/23/19 1203   Discharge Assessment   Assessment Type Discharge Planning Reassessment

## 2019-07-23 NOTE — DISCHARGE SUMMARY
[facility]  Hospital Medicine  Discharge Summary    Patient Name: Ivanna Mary  MRN: [id number]  Admission Date: 7/22/2019  Hospital Length of Stay: 0 days  Discharge Date and Time: No discharge date for patient encounter.  Attending Physician: Samantha Lisa MD   Discharging Provider: VIRGIL Haque  Primary Care Provider: Primary Doctor No    HPI:   This is a 39-year-old woman with a history of anxiety disorder, migraine headache, rheumatoid arthritis, and tobacco abuse presents emergency department for evaluation of sudden onset of constant substernal chest pain without radiation and with associated nausea that awoke her from sleep.  The symptoms have lasted approximately 1.5 hr in her still ongoing.  She has no associated shortness of breath.  Does not take birth control.  Has no family history of ACS or heart attacks.  The patient was placed in the hospital for further evaluation treatment.    Patient reports she woke up this morning around 4:00 a.m. with severe left-sided hip chest discomfort being sharp or stabbing light of 7 to 8/10.  Patient reports increased pain with deep inspiration and changes in position.  Patient reports she has a history of migraine headaches and self administers Aimovig  for her migraine headaches and took a dose yesterday.  She reports sometimes after taking it she has nausea and hot flashes.    * No surgery found *      Hospital Course:   The patient was monitored closely during her stay.  Cardiology was consulted.  She was kept on continuous telemetry monitoring where she remained in sinus rhythm. Her troponins were trended which remained negative.  She was given IV fluids for hydration.  It was initially felt the patient may have had pericarditis however her inflammatory markers remained negative.  The patient reported chronic migraine headaches for which she stated she was being followed by a pain management specialist.  She was continued on her  home pain medication regimen.  She was educated on the importance of smoking cessation.  The patient reported her chest pain resolved. The patient was initially noted to have leukocytosis which was suspected secondary to dehydration. Her UA and chest x-ray remained negative for infection.  No clinical signs of infection or noted. The patient was not given any antibiotics and her leukocytosis resolved after IV fluid administration. The patient was noted to have some mild hypotension during her stay after taking her home narcotics and it was discussed with the patient the importance of decreasing her chronic narcotics as much as possible. Her overall condition remains stable. She was discharged home once cleared by Cardiology.      Consults:   Consults (From admission, onward)        Status Ordering Provider     Inpatient consult to Cardiology  Once     Provider:  Miley Montiel MD    Acknowledged ROB HEREDIA          Final Active Diagnoses:    Diagnosis Date Noted POA    Anxiety disorder [F41.9] 07/22/2019 Yes    Nicotine dependence [F17.200] 07/22/2019 Yes    Hypokalemia [E87.6] 07/22/2019 Yes    Abnormal EKG [R94.31] 07/22/2019 Yes    Chronic pain syndrome [G89.4] 07/22/2019 Yes      Problems Resolved During this Admission:    Diagnosis Date Noted Date Resolved POA    PRINCIPAL PROBLEM:  Chest pain [R07.9] 07/22/2019 07/23/2019 Yes    Dehydration [E86.0] 07/23/2019 07/23/2019 Yes    Hypotension [I95.9] 07/22/2019 07/23/2019 Yes    Leukocytosis [D72.829] 07/22/2019 07/23/2019 Yes     Discharged Condition: stable    Disposition: Home or Self Care    Follow Up:  Follow-up Information     Yahaira Cruz NP On 7/30/2019.    Specialty:  Family Medicine  Why:  Follow-up with PCP as outpatient for further outpatient monitoring and evaluation-patient with mildly abnormal abdominal ultrasound showing Mild right hydronephrosis with debris within the collecting system (DR. GTZ'S OFFICE WAS NOT ACCEPTING  NEW PTS)  Contact information:  [address] [place] [id number]  [phone]             Rheumatology In 2 weeks.    Why:  Follow-up as outpatient history of rheumatoid arthritis           Neurology.    Why:  Follow-up with Neurology for further migraine headache management and chronic pain               Patient Instructions:      Diet Adult Regular   Order Comments: Drink plenty of fluids     Notify your health care provider if you experience any of the following:  difficulty breathing or increased cough     Notify your health care provider if you experience any of the following:   Order Comments: Any decline in condition     Activity as tolerated   Order Comments: No driving till cleared by your neurologist     Significant Diagnostic Studies:     Sed rate 2    D-dimer < 0.19    Lipase 15    CRP 0.4    BNP 18    Urine pregnancy test negative    XR CHEST PA AND LATERAL-    TECHNIQUE:  PA and lateral views of the chest were performed.    COMPARISON:  None    FINDINGS:  The lungs are clear, with normal appearance of pulmonary vasculature and no pleural effusion or pneumothorax.    The cardiac silhouette is normal in size. The hilar and mediastinal contours are unremarkable.    Bones are intact.      Impression       No acute abnormality.      Electronically signed by: Jim Del Rosario MD  Date: 07/22/2019  Time: 07:52          US ABDOMEN LIMITED-    TECHNIQUE:  Limited ultrasound of the right upper quadrant of the abdomen (including pancreas, liver, gallbladder, common bile duct, and right kidney) was performed.    COMPARISON:  None.    FINDINGS:  The visualized pancreas is unremarkable.  The IVC is normal caliber.  No gallstones are identified.  Sonographic Shukla sign is negative.  The common bile duct is normal caliber at 4.7 mm.  The liver is normal in size without focal lesion.    The right kidney is normal in size, demonstrating mild hydronephrosis and isoechoic material within the collecting system.  There  is a 2.1 cm cyst of the midpole, with calcification along its margin.      Impression       Mild right hydronephrosis with debris within the collecting system.    2.1 cm right renal cyst.      Electronically signed by: Florentino Contreras MD  Date: 07/22/2019  Time: 11:37           CMP   Recent Labs   Lab 07/22/19  0540 07/23/19  0941    139   K 3.5 4.2    115*   CO2 23 21*   GLU 95 89   BUN 12 9   CREATININE 0.8 0.7   CALCIUM 9.3 8.3*   PROT 6.3 5.1*   ALBUMIN 4.0 3.0*   BILITOT 0.2 0.2   ALKPHOS 46* 42*   AST 11 12   ALT 9* 10   ANIONGAP 8 3*   ESTGFRAFRICA >60 >60   EGFRNONAA >60 >60   CBC   Recent Labs   Lab 07/22/19  0540 07/23/19  0941   WBC 20.48* 10.38   HGB 14.5 12.2   HCT 43.7 38.1    187     Recent Labs   Lab 07/22/19  1707   TROPONINI <0.006     2D echocardiogram- final transcription pending     Transthoracic echo (TTE) 2D with Color Flow [[id number]] Resulted:  07/22/19 1635    Order Status:  Sent Lab Status:  In process Updated:  07/22/19 1635     AV mean gradient 2 mmHg      Ao peak yancy 0.96 m/s      Ao VTI 19.34 cm      IVRT 0.10 msec      IVS 0.84 cm      LA size 2.31 cm      LVIDD 3.63 cm      LVIDS 2.62 cm      LVOT diameter 1.97 cm      LVOT peak VTI 20.50 cm      PW 0.82 cm      MV Peak A Yancy 0.52 m/s      E wave decelartion time 185.05 msec      MV Peak E Yancy 0.62 m/s      PV Peak D Yancy 0.59 m/s      PV Peak S Yancy 0.64 m/s      RVDD 2.52 cm      TAPSE 1.79 cm      TR Max Yancy 2.36 m/s      Ao root annulus 2.10 cm      AORTIC VALVE CUSP SEPERATION 1.67 cm      PV PEAK VELOCITY 0.75 cm/s      LV Diastolic Volume 55.40 mL      LV Systolic Volume 25.15 mL      LVOT peak yancy 0.93 m/s      FS 28 %      LV mass 83.97 g      Left Ventricle Relative Wall Thickness 0.45 cm      AV valve area 3.23 cm2      AV Velocity Ratio 0.97     AV index (prosthetic) 1.06     E/A ratio 1.19     Pulm vein S/D ratio 1.08     LVOT area 3.0 cm2      LVOT stroke volume 62.45 cm3      AV peak gradient 4 mmHg   "    LV Systolic Volume Index 17.4 mL/m2      LV Diastolic Volume Index 38.27 mL/m2      LV Mass Index 58 g/m2      Triscuspid Valve Regurgitation Peak Gradient 22 mmHg      BSA 1.45 m2      TDI SEPTAL 0.11 m/s      LV LATERAL E/E' RATIO 5.17 m/s      LV SEPTAL E/E' RATIO 5.64 m/s      TDI LATERAL 0.12 m/s      Mean e' 0.12 m/s      MV "A" wave duration 115.00 msec      Pulm vein "A" wave 106.00 msec      E/E' ratio 5.39 m/s     Narrative:       · Concentric left ventricular remodeling.       Impression:           Transthoracic echo (TTE) 2D with Color Flow [[id number]]     Order Status:  Sent Lab Status:  No result          Medications:  Reconciled Home Medications:      Medication List      START taking these medications    pantoprazole 40 MG tablet  Commonly known as:  PROTONIX  Take 1 tablet (40 mg total) by mouth once daily. for 14 days  Start taking on:  7/24/2019        CHANGE how you take these medications    * oxyCODONE 30 mg Tr12 12 hr tablet  Commonly known as:  OXYCONTIN  Take 30 mg by mouth every 12 (twelve) hours.  What changed:  Another medication with the same name was changed. Make sure you understand how and when to take each.     * oxyCODONE 15 MG Tab  Commonly known as:  ROXICODONE  Take 2 tablets (30 mg total) by mouth every 6 (six) hours as needed for Pain.  What changed:  when to take this         * This list has 2 medication(s) that are the same as other medications prescribed for you. Read the directions carefully, and ask your doctor or other care provider to review them with you.            CONTINUE taking these medications    AIMOVIG AUTOINJECTOR (2 PACK) 70 mg/mL injection  Generic drug:  erenumab-aooe  Inject 70 mg into the skin every 28 days.     clonazePAM 1 MG tablet  Commonly known as:  KLONOPIN  Take 1 mg by mouth 2 (two) times daily.     tiZANidine 4 MG tablet  Commonly known as:  ZANAFLEX  Take 4 mg by mouth 3 (three) times daily as needed.     * topiramate 100 MG tablet  Commonly " known as:  TOPAMAX  Take 100 mg by mouth once daily.     * topiramate 100 MG tablet  Commonly known as:  TOPAMAX  Take 300 mg by mouth every evening.         * This list has 2 medication(s) that are the same as other medications prescribed for you. Read the directions carefully, and ask your doctor or other care provider to review them with you.                Indwelling Lines/Drains at time of discharge:   Lines/Drains/Airways          None        Time spent on the discharge of patient: 54 minutes     VIRGIL Haque  Department of Hospital Medicine  [facility]

## 2019-07-23 NOTE — PROGRESS NOTES
Concerned about low b/p   Informed NP J Suit of recent b/p of 85/51 and 86/52. Orders given and noted

## 2019-07-23 NOTE — HOSPITAL COURSE
The patient was monitored closely during her stay.  Cardiology was consulted.  She was kept on continuous telemetry monitoring where she remained in sinus rhythm. Her troponins were trended which remained negative.  She was given IV fluids for hydration.  It was initially felt the patient may have had pericarditis however her inflammatory markers remained negative.  The patient reported chronic migraine headaches for which she stated she was being followed by a pain management specialist.  She was continued on her home pain medication regimen.  She was educated on the importance of smoking cessation.  The patient reported her chest pain resolved. The patient was initially noted to have leukocytosis which was suspected secondary to dehydration. Her UA and chest x-ray remained negative for infection.  No clinical signs of infection or noted. The patient was not given any antibiotics and her leukocytosis resolved after IV fluid administration. The patient was noted to have some mild hypotension during her stay after taking her home narcotics and it was discussed with the patient the importance of decreasing her chronic narcotics as much as possible. Her overall condition remains stable. She was discharged home once cleared by Cardiology.

## 2019-07-23 NOTE — PLAN OF CARE
Problem: Adult Inpatient Plan of Care  Goal: Plan of Care Review  Outcome: Ongoing (interventions implemented as appropriate)  Bed is in low position, call light is within reach, SR up x 2, instructed to use call light for assistance.  Cardiac monitored SR, BP was 80s/50s, 500 ml Bolus of NaCl was given per NP order, BP 100s/50s, no complaints of pain, patient was very drowsy at the beginning of shift with slurred speech, more alert and oriented now, will continue to monitor and round.

## 2019-07-23 NOTE — PLAN OF CARE
Problem: Adult Inpatient Plan of Care  Goal: Plan of Care Review  Outcome: Ongoing (interventions implemented as appropriate)  Pt wanted to AMA.  Waiting on cardiologist for ok to D/C. Charge nurse talked to cardiologist.

## 2019-07-24 ENCOUNTER — PATIENT OUTREACH (OUTPATIENT)
Dept: ADMINISTRATIVE | Facility: CLINIC | Age: 39
End: 2019-07-24

## 2019-07-24 NOTE — PATIENT INSTRUCTIONS
Chest Pain, Uncertain Cause  Chest pain can happen for a number of reasons. Sometimes the cause can not be determined. If your condition does not seem serious, and your pain does not appear to be coming from your heart, your doctor may recommend watching it closely. Sometimes the signs of a serious problem take more time to appear. Therefore, watch for the warning signs listed below.    Home care  After your visit, follow these recommendations:  Rest today and avoid strenuous activity.  Take any prescribed medicine as directed.    Follow-up care  Follow up with your doctor or this facility as instructed or if you do not start to feel better within 24 hours.    Call 911  Get immediate medical attention if any of the following occur:  A change in the type of pain: if it feels different, becomes more severe, lasts longer, or begins to spread into your shoulder, arm, neck, jaw or back  Shortness of breath or increased pain with breathing  Weakness, dizziness, or fainting  Rapid heart beat    Get prompt medical attention  Call your doctor right away if any of the following occur:  Cough with dark colored sputum (phlegm) or blood  Fever of 100.4ºF (38ºC) or higher, or as directed by your health care provider  Swelling, pain or redness in one leg    © 4597-2730 [employer], [address] [place] [address], [place] [address]. All rights reserved. This information is not intended as a substitute for professional medical care. Always follow your healthcare professional's instructions.

## 2019-07-25 LAB
AORTIC ROOT ANNULUS: 2.1 CM
AORTIC VALVE CUSP SEPERATION: 1.67 CM
AV INDEX (PROSTH): 1.06
AV MEAN GRADIENT: 2 MMHG
AV PEAK GRADIENT: 4 MMHG
AV VALVE AREA: 3.23 CM2
AV VELOCITY RATIO: 0.97
BSA FOR ECHO PROCEDURE: 1.45 M2
CV ECHO LV RWT: 0.45 CM
DOP CALC AO PEAK VEL: 0.96 M/S
DOP CALC AO VTI: 19.34 CM
DOP CALC LVOT AREA: 3 CM2
DOP CALC LVOT DIAMETER: 1.97 CM
DOP CALC LVOT PEAK VEL: 0.93 M/S
DOP CALC LVOT STROKE VOLUME: 62.45 CM3
DOP CALCLVOT PEAK VEL VTI: 20.5 CM
E WAVE DECELERATION TIME: 185.05 MSEC
E/A RATIO: 1.19
E/E' RATIO: 5.39 M/S
ECHO LV POSTERIOR WALL: 0.82 CM (ref 0.6–1.1)
FRACTIONAL SHORTENING: 28 % (ref 28–44)
INTERVENTRICULAR SEPTUM: 0.84 CM (ref 0.6–1.1)
IVRT: 0.1 MSEC
LEFT ATRIUM SIZE: 2.31 CM
LEFT INTERNAL DIMENSION IN SYSTOLE: 2.62 CM (ref 2.1–4)
LEFT VENTRICLE DIASTOLIC VOLUME INDEX: 38.27 ML/M2
LEFT VENTRICLE DIASTOLIC VOLUME: 55.4 ML
LEFT VENTRICLE MASS INDEX: 58 G/M2
LEFT VENTRICLE SYSTOLIC VOLUME INDEX: 17.4 ML/M2
LEFT VENTRICLE SYSTOLIC VOLUME: 25.15 ML
LEFT VENTRICULAR INTERNAL DIMENSION IN DIASTOLE: 3.63 CM (ref 3.5–6)
LEFT VENTRICULAR MASS: 83.97 G
LV LATERAL E/E' RATIO: 5.17 M/S
LV SEPTAL E/E' RATIO: 5.64 M/S
MV A" WAVE DURATION": 115 MSEC
MV PEAK A VEL: 0.52 M/S
MV PEAK E VEL: 0.62 M/S
PISA TR MAX VEL: 2.36 M/S
PULM VEIN A" WAVE DURATION": 106 MSEC
PULM VEIN S/D RATIO: 1.08
PV PEAK D VEL: 0.59 M/S
PV PEAK S VEL: 0.64 M/S
PV PEAK VELOCITY: 0.75 CM/S
RA PRESSURE: 3 MMHG
RIGHT VENTRICULAR END-DIASTOLIC DIMENSION: 2.52 CM
TDI LATERAL: 0.12 M/S
TDI SEPTAL: 0.11 M/S
TDI: 0.12 M/S
TR MAX PG: 22 MMHG
TRICUSPID ANNULAR PLANE SYSTOLIC EXCURSION: 1.79 CM
TV REST PULMONARY ARTERY PRESSURE: 25 MMHG

## 2019-07-30 ENCOUNTER — OFFICE VISIT (OUTPATIENT)
Dept: FAMILY MEDICINE | Facility: CLINIC | Age: 39
End: 2019-07-30
Payer: MEDICARE

## 2019-07-30 VITALS
BODY MASS INDEX: 22.97 KG/M2 | WEIGHT: 117 LBS | HEIGHT: 60 IN | DIASTOLIC BLOOD PRESSURE: 58 MMHG | SYSTOLIC BLOOD PRESSURE: 98 MMHG | TEMPERATURE: 99 F | HEART RATE: 76 BPM

## 2019-07-30 DIAGNOSIS — R07.89 OTHER CHEST PAIN: ICD-10-CM

## 2019-07-30 DIAGNOSIS — G89.4 CHRONIC PAIN SYNDROME: ICD-10-CM

## 2019-07-30 DIAGNOSIS — G43.019 INTRACTABLE MIGRAINE WITHOUT AURA AND WITHOUT STATUS MIGRAINOSUS: ICD-10-CM

## 2019-07-30 DIAGNOSIS — F17.210 CIGARETTE NICOTINE DEPENDENCE WITHOUT COMPLICATION: ICD-10-CM

## 2019-07-30 DIAGNOSIS — N13.30 HYDRONEPHROSIS OF RIGHT KIDNEY: ICD-10-CM

## 2019-07-30 DIAGNOSIS — M06.9 RHEUMATOID ARTHRITIS INVOLVING MULTIPLE SITES, UNSPECIFIED RHEUMATOID FACTOR PRESENCE: ICD-10-CM

## 2019-07-30 DIAGNOSIS — Z09 HOSPITAL DISCHARGE FOLLOW-UP: Primary | ICD-10-CM

## 2019-07-30 DIAGNOSIS — F41.1 GENERALIZED ANXIETY DISORDER: ICD-10-CM

## 2019-07-30 PROCEDURE — 99999 PR PBB SHADOW E&M-EST. PATIENT-LVL IV: CPT | Mod: PBBFAC,,, | Performed by: NURSE PRACTITIONER

## 2019-07-30 PROCEDURE — 99496 TRANSITIONAL CARE MANAGE SERVICE 7 DAY DISCHARGE: ICD-10-PCS | Mod: S$PBB,,, | Performed by: NURSE PRACTITIONER

## 2019-07-30 PROCEDURE — 99214 OFFICE O/P EST MOD 30 MIN: CPT | Mod: PBBFAC,PO | Performed by: NURSE PRACTITIONER

## 2019-07-30 PROCEDURE — 99999 PR PBB SHADOW E&M-EST. PATIENT-LVL IV: ICD-10-PCS | Mod: PBBFAC,,, | Performed by: NURSE PRACTITIONER

## 2019-07-30 PROCEDURE — 99496 TRANSJ CARE MGMT HIGH F2F 7D: CPT | Mod: PBBFAC,PO | Performed by: NURSE PRACTITIONER

## 2019-07-30 PROCEDURE — 99496 TRANSJ CARE MGMT HIGH F2F 7D: CPT | Mod: S$PBB,,, | Performed by: NURSE PRACTITIONER

## 2019-07-30 RX ORDER — OXYCODONE HYDROCHLORIDE 30 MG/1
30 TABLET ORAL EVERY 6 HOURS PRN
COMMUNITY

## 2019-07-30 NOTE — PROGRESS NOTES
Subjective:       Patient ID: Ivanna Mary is a 39 y.o. female.    Chief Complaint: Hospital Follow Up    Chief Complaint  Chief Complaint   Patient presents with    Hospital Follow Up     Transitional Care Note    Family and/or Caretaker present at visit?  No.  Diagnostic tests reviewed/disposition: No diagnosic tests pending after this hospitalization.  Disease/illness education: GERD  Home health/community services discussion/referrals: Patient does not have home health established from hospital visit.  They do not need home health.  If needed, we will set up home health for the patient.   Establishment or re-establishment of referral orders for community resources: No other necessary community resources.   Discussion with other health care providers: No discussion with other health care providers necessary.           HPI  Ivanna Mary is a 39 y.o. female with medical diagnoses as listed in the medical history and problem list that presents as a new patient to establish care and for hospital follow-up.  Patient was admitted to [facility] on 7/22/2019 and was discharged on 7/23/2019.  Patient presented to the emergency room with concern planes of substernal chest pain with shortness of breath without radiation that was associated with nausea and woke her from her sleep.  She stated hospital overnight for evaluation.  Cardiology was consulted and she was kept on continuous telemetry monitoring where she remained in sinus rhythm.  Troponins were trended and remained negative.  Patient initially had an elevated white blood cell count but this was back to normal by the time of discharge, and was felt to be due to some dehydration as the urinalysis and chest x-ray were negative. D-dimer was done and was negative.  Sed rate and CRP were also negative.  The patient had a chest x-ray that was normal, an ultrasound of the abdomen that was normal with the exception of some mild right hydronephrosis with debris  noted within the collecting system and a 2.1 cm right renal cyst, and an echocardiogram which was essentially normal but did show some concentric left ventricular remodeling.  The patient was discharged home on Protonix 40 mg to be taken for 14 days.  This patient lives in [place] and is here in [place] visiting her father for several weeks.  It was recommended by the hospitalist that she see a rheumatologist for follow-up on her rheumatoid arthritis and her neurologist for follow-up on migraines the patient is already established with physicians in [place].  The patient is currently treated for anxiety, migraine headaches, rheumatoid arthritis, and tobacco use.  She is on chronic opioid therapy for pain management.  She has also started Aimovig for her migraine headaches. States she doesn't feel that this has helped her yet.   She takes Topamax daily for headaches and clonazepam 1 mg 2 times daily for anxiety.  She also has a prescription for Zanaflex to be taken as needed.  Pain medication is prescribed by Pain Management for headaches and neck pain. 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 Since discharge has had some mild chest pain with a deep breath. Patient also states that she noted some swelling after discharge but this has resolved.  Blood pressure today is 98/58.  BMI is 22.85 with a weight of 117 lb.  She is showing that she is due for health maintenance in the [facility] system, however this will not be offered today as patient will be returning to her primary care team in [place].         PAST MEDICAL HISTORY:  Past Medical History:   Diagnosis Date    Anxiety     Arthritis     Depression     Mass     couple masses on left occipital    Migraine        PAST SURGICAL HISTORY:  Past Surgical History:   Procedure Laterality Date    APPENDECTOMY       SECTION      KNEE SURGERY Bilateral     had  growths bilateral knees removed, some internal/some external beneign    TONSILLECTOMY         SOCIAL HISTORY:  Social History     Socioeconomic History    Marital status:      Spouse name: Not on file    Number of children: 2    Years of education: Not on file    Highest education level: Not on file   Occupational History    Not on file   Social Needs    Financial resource strain: Not on file    Food insecurity:     Worry: Not on file     Inability: Not on file    Transportation needs:     Medical: Not on file     Non-medical: Not on file   Tobacco Use    Smoking status: Current Some Day Smoker     Packs/day: 0.50    Smokeless tobacco: Never Used   Substance and Sexual Activity    Alcohol use: Not Currently    Drug use: Never    Sexual activity: Yes     Partners: Male   Lifestyle    Physical activity:     Days per week: Not on file     Minutes per session: Not on file    Stress: Not on file   Relationships    Social connections:     Talks on phone: Not on file     Gets together: Not on file     Attends Ca[employer] service: Not on file     Active member of club or organization: Not on file     Attends meetings of clubs or organizations: Not on file     Relationship status: Not on file   Other Topics Concern    Not on file   Social History Narrative    Not on file       FAMILY HISTORY:  Family History   Problem Relation Age of Onset    Cancer Mother         breast w/mets  of ca    Hypertension Mother     No Known Problems Father     Cerebral palsy Brother     Cancer Maternal Grandmother         breast    No Known Problems Brother        ALLERGIES AND MEDICATIONS: updated and reviewed.  Review of patient's allergies indicates:   Allergen Reactions    Depakote [divalproex]      Constricts chest causes pass out    Imitrex [sumatriptan succinate]      constricts chest then pass out    Vasopressin analogues Other (See Comments)     Constricts chest then pass out     Current Outpatient  Medications   Medication Sig Dispense Refill    clonazePAM (KLONOPIN) 1 MG tablet Take 1 mg by mouth 2 (two) times daily.      erenumab-aooe (AIMOVIG AUTOINJECTOR, 2 PACK,) 70 mg/mL injection Inject 70 mg into the skin every 28 days.      oxyCODONE (OXYCONTIN) 30 mg TR12 12 hr tablet Take 30 mg by mouth every 12 (twelve) hours.      oxyCODONE (ROXICODONE) 30 MG Tab Take 30 mg by mouth every 6 (six) hours as needed.      pantoprazole (PROTONIX) 40 MG tablet Take 1 tablet (40 mg total) by mouth once daily. for 14 days 14 tablet 0    tiZANidine (ZANAFLEX) 4 MG tablet Take 4 mg by mouth 3 (three) times daily as needed.      topiramate (TOPAMAX) 100 MG tablet Take 400 mg by mouth every evening. 100 mg in the morning 300 mg at bed time.       No current facility-administered medications for this visit.        I have reviewed the patient's medical, family, and social history in detail and updated the computerized patient record.    Review of Systems   Constitutional: Negative for activity change, appetite change, chills, fatigue and fever.   HENT: Negative for congestion, ear pain, nosebleeds, postnasal drip, rhinorrhea, sinus pressure, sinus pain and sore throat.    Eyes: Negative for visual disturbance.        Wears glasses for distance.   Respiratory: Negative for cough and shortness of breath.    Cardiovascular: Positive for chest pain. Negative for palpitations and leg swelling.        Has had mild chest pain since discharge, not like when she went to the ER.   Gastrointestinal: Negative for abdominal pain, blood in stool, constipation, diarrhea, nausea and vomiting.        Taking protonix, occasional constipation with pain medication, takes miralax with relief.    Genitourinary: Positive for menstrual problem. Negative for difficulty urinating and dysuria.        Periods are irregular, last was about 2 weeks ago.   Musculoskeletal: Positive for arthralgias and neck pain.        Pain to bilateral knees, wrists,  and neck   Skin: Negative for rash and wound.   Neurological: Positive for headaches. Negative for dizziness and numbness.        Constant headache for 8 years, belongs to a support group.   Hematological: Does not bruise/bleed easily.   Psychiatric/Behavioral: Negative for dysphoric mood and sleep disturbance. The patient is nervous/anxious.         Patient does have chronic anxiety, describes herself as a worrier, clonazepam takes the edge off of the mind racing, usually sleeps fairly well with clonazepam.         Objective:      Vitals:    07/30/19 0914   BP: (!) 98/58   BP Location: Right arm   Patient Position: Sitting   BP Method: Medium (Manual)   Pulse: 76   Temp: 98.7 °F (37.1 °C)   TempSrc: Oral   Weight: 53.1 kg (117 lb)   Height: 5' (1.524 m)     Physical Exam   Constitutional: She is oriented to person, place, and time. Vital signs are normal. She appears well-developed and well-nourished. No distress.   Blood pressure 98/58   HENT:   Head: Normocephalic and atraumatic.   Right Ear: Hearing, tympanic membrane, external ear and ear canal normal.   Left Ear: Hearing, tympanic membrane, external ear and ear canal normal.   Nose: Nose normal. No mucosal edema.   Mouth/Throat: Oropharynx is clear and moist and mucous membranes are normal. Normal dentition. No oropharyngeal exudate.   Eyes: Pupils are equal, round, and reactive to light. Conjunctivae, EOM and lids are normal. Right eye exhibits no discharge. Left eye exhibits no discharge. Right conjunctiva is not injected. Left conjunctiva is not injected.   Neck: Normal range of motion. Neck supple. Normal carotid pulses present. Carotid bruit is not present. No thyromegaly present.   Cardiovascular: Normal rate, regular rhythm, S1 normal, S2 normal, normal heart sounds, intact distal pulses and normal pulses.   No murmur heard.  Pulses:       Carotid pulses are 2+ on the right side, and 2+ on the left side.       Radial pulses are 2+ on the right side, and  2+ on the left side.        Posterior tibial pulses are 2+ on the right side, and 2+ on the left side.   No edema   Pulmonary/Chest: Effort normal and breath sounds normal. No respiratory distress. She has no decreased breath sounds. She has no wheezes. She has no rhonchi. She has no rales.   Abdominal: Soft. Normal appearance, normal aorta and bowel sounds are normal. She exhibits no distension, no abdominal bruit, no pulsatile midline mass and no mass. There is no hepatosplenomegaly. There is no tenderness. No hernia.   Musculoskeletal: Normal range of motion. She exhibits no edema, tenderness or deformity.   Lymphadenopathy:        Head (right side): No submental, no submandibular and no tonsillar adenopathy present.        Head (left side): No submental, no submandibular and no tonsillar adenopathy present.     She has no cervical adenopathy.        Right: No supraclavicular adenopathy present.        Left: No supraclavicular adenopathy present.   Neurological: She is alert and oriented to person, place, and time. She has normal strength. Gait normal.   Skin: Skin is warm, dry and intact. No rash noted. She is not diaphoretic. No erythema. No pallor.   Psychiatric: She has a normal mood and affect. Her speech is normal and behavior is normal. Judgment and thought content normal. Her mood appears not anxious. Cognition and memory are normal. She does not exhibit a depressed mood.   Nursing note and vitals reviewed.        Assessment:       1. Hospital discharge follow-up    2. Other chest pain    3. Intractable migraine without aura and without status migrainosus    4. Rheumatoid arthritis involving multiple sites, unspecified rheumatoid factor presence    5. Generalized anxiety disorder    6. Hydronephrosis of right kidney    7. Chronic pain syndrome    8. Cigarette nicotine dependence without complication    9. BMI 22.0-22.9, adult          Plan:       Ivanna was seen today for hospital follow up.    Diagnoses  and all orders for this visit:    Hospital discharge follow-up   Hospital records, discharge summary,  test results, blood work results reviewed, medication list reconciled.   Patient was provided with copy of discharge summary to share with her providers in [place]  Other chest pain   Cardiac cause ruled out in hospital   Discharged with protonix for GERD to take for 14 days and is using the medication as prescribed   Educational handouts provided.  Uncertain causes of chest pain. GERD.  Intractable migraine without aura and without status migrainosus   Continue current treatment and follow up with neurologist in [place]  Rheumatoid arthritis involving multiple sites, unspecified rheumatoid factor presence   Follow-up with rheumatologist in [place]   Generalized anxiety disorder   Continue current medications and follow up with provider in [place]   Hydronephrosis of right kidney   Patient advised that ultrasound of abdomen shows hydronephrosis of the right kidney and this is something that should be followed by her primary care provider in [place]   Chronic pain syndrome   Continue current medications and follow up with pain management doctor in [place]  Cigarette nicotine dependence without complication   Smoking cessation strongly encouraged, patient is not interested at this time  BMI 22.0-22.9, adult   BMI today is 22.85 with a weight of 117 lb   Maintain healthy weight with heathy diet and exercise/active lifestyle    Follow up if symptoms worsen or fail to improve, for Will return to the care of her regular care team in [place].

## 2019-07-30 NOTE — PATIENT INSTRUCTIONS
Uncertain Causes of Chest Pain    Chest pain can happen for a number of reasons. Sometimes the cause can't be determined. If your condition does not seem serious, and your pain does not appear to be coming from your heart, your healthcare provider may recommend watching it closely. Sometimes the signs of a serious problem take more time to appear. Many problems not related to your heart can cause chest pain.These include:  · Musculoskeletal. Costochondritis, an inflammation of the tissues around the ribs that can occur from trauma or overuse injuries  · Respiratory. Pneumonia, pneumothorax, or pneumonitis (inflammation of the lining of the chest and lungs)  · Gastrointestinal. Esophageal reflux, heartburn, or gallbladder disease  · Anxiety and panic disorders  · Nerve compression and neuritis  · Miscellaneous problems such as aortic aneurysm or pulmonary embolism (a blood clot in the lungs)  Home care  After your visit, follow these recommendations:  · Rest today and avoid strenuous activity.  · Take any prescribed medicine as directed.  · Be aware of any recurrent chest pain and notice any changes  Follow-up care  Follow up with your healthcare provider if you do not start to feel better within 24 hours, or as advised.  Call 911  Call 911 if any of these occur:  · A change in the type of pain: if it feels different, becomes more severe, lasts longer, or begins to spread into your shoulder, arm, neck, jaw or back  · Shortness of breath or increased pain with breathing  · Weakness, dizziness, or fainting  · Rapid heart beat  · Crushing sensation in your chest  When to seek medical advice  Call your healthcare provider right away if any of the following occur:  · Cough with dark colored sputum (phlegm) or blood  · Fever of 100.4ºF (38ºC) or higher, or as directed by your healthcare provider  · Swelling, pain or redness in one leg  · Shortness of breath  Date Last Reviewed: 12/30/2015  © 7004-3522 [facility]  [employer]. [address] [place] [address]. All rights reserved. This information is not intended as a substitute for professional medical care. Always follow your healthcare professional's instructions.        GERD (Adult)    The esophagus is a tube that carries food from the mouth to the stomach. A valve at the lower end of the esophagus prevents stomach acid from flowing upward. When this valve doesn't work properly, stomach contents may repeatedly flow back up (reflux) into the esophagus. This is called gastroesophageal reflux disease (GERD). GERD can irritate the esophagus. It can cause problems with swallowing or breathing. In severe cases, GERD can cause recurrent pneumonia or other serious problems.  Symptoms of reflux include burning, pressure or sharp pain in the upper abdomen or mid to lower chest. The pain can spread to the neck, back, or shoulder. There may be belching, an acid taste in the back of the throat, chronic cough, or sore throat or hoarseness. GERD symptoms often occur during the day after a big meal. They can also occur at night when lying down.   Home care  Lifestyle changes can help reduce symptoms. If needed, medicines may be prescribed. Symptoms often improve with treatment, but if treatment is stopped, the symptoms often return after a few months. So most persons with GERD will need to continue treatment.  Lifestyle changes  · Limit or avoid fatty, fried, and spicy foods, as well as coffee, chocolate, mint, and foods with high acid content such as tomatoes and citrus fruit and juices (orange, grapefruit, lemon).  · Dont eat large meals, especially at night. Frequent, smaller meals are best. Do not lie down right after eating. And dont eat anything 3 hours before going to bed.  · Avoid drinking alcohol and smoking. As much as possible, stay away from second hand smoke.  · If you are overweight, losing weight will reduce symptoms.   · Avoid wearing tight clothing around  "your stomach area.  · If your symptoms occur during sleep, use a foam wedge to elevate your upper body (not just your head.) Or, place 4" blocks under the head of your bed.  Medicines  If needed, medicines can help relieve the symptoms of GERD and prevent damage to the esophagus. Discuss a medicine plan with your healthcare provider. This may include one or more of the following medicines:  · Antacids to help neutralize the normal acids in your stomach.  · Acid blockers (H2 blockers) to decrease acid production.  · Acid inhibitors (PPIs) to decrease acid production in a different way than the blockers. They may work better, but can take a little longer to take effect.  Take an antacid 30-60 minutes after eating and at bedtime, but not at the same time as an acid blocker.  Try not to take medicines such as ibuprofen and aspirin. If you are taking aspirin for your heart or other medical reasons, talk to your healthcare provider about stopping it.  Follow-up care  Follow up with your healthcare provider or as advised by our staff.  When to seek medical advice  Call your healthcare provider if any of the following occur:  · Stomach pain gets worse or moves to the lower right abdomen (appendix area)  · Chest pain appears or gets worse, or spreads to the back, neck, shoulder, or arm  · Frequent vomiting (cant keep down liquids)  · Blood in the stool or vomit (red or black in color)  · Feeling weak or dizzy  · Fever of 100.4ºF (38ºC) or higher, or as directed by your healthcare provider  Date Last Reviewed: 6/23/2015  © 4619-3940 [employer]. [address] [place] [address], [place] [address]. All rights reserved. This information is not intended as a substitute for professional medical care. Always follow your healthcare professional's instructions.        "

## 2020-02-14 ENCOUNTER — HOSPITAL (OUTPATIENT)
Dept: OTHER | Age: 40
End: 2020-02-14

## 2020-03-23 ENCOUNTER — WALK IN (OUTPATIENT)
Dept: URGENT CARE | Age: 40
End: 2020-03-23
Attending: FAMILY MEDICINE
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
## 2020-03-23 DIAGNOSIS — S61.451A DOG BITE OF RIGHT HAND, INITIAL ENCOUNTER: Primary | ICD-10-CM

## 2020-03-23 PROCEDURE — 99212 OFFICE O/P EST SF 10 MIN: CPT

## 2020-03-23 RX ORDER — AMOXICILLIN AND CLAVULANATE POTASSIUM 875; 125 MG/1; MG/1
1 TABLET, FILM COATED ORAL EVERY 12 HOURS
Qty: 20 TABLET | Refills: 0 | Status: SHIPPED | OUTPATIENT
Start: 2020-03-23 | End: 2023-02-09 | Stop reason: ALTCHOICE

## 2020-03-23 ASSESSMENT — PAIN SCALES - GENERAL: PAINLEVEL: 7-8

## 2020-06-27 ENCOUNTER — WALK IN (OUTPATIENT)
Dept: URGENT CARE | Age: 40
End: 2020-06-27
Attending: FAMILY MEDICINE

## 2020-06-27 DIAGNOSIS — R30.0 DYSURIA: ICD-10-CM

## 2020-06-27 DIAGNOSIS — N30.00 ACUTE CYSTITIS WITHOUT HEMATURIA: Primary | ICD-10-CM

## 2020-06-27 LAB
APPEARANCE, POC: NORMAL
B-HCG UR QL: NEGATIVE
BILIRUBIN, POC: NORMAL
COLOR, POC: NORMAL
GLUCOSE UR-MCNC: NORMAL MG/DL
KETONES, POC: NEGATIVE
NITRITE, POC: POSITIVE
OCCULT BLOOD, POC: NORMAL
PH UR: 5 [PH] (ref 5–7)
PROT UR-MCNC: NORMAL G/DL
SP GR UR: 1 (ref 1–1.03)
UROBILINOGEN UR-MCNC: 2 MG/DL (ref 0–1)
WBC (LEUKOCYTE) ESTERASE, POC: NORMAL

## 2020-06-27 PROCEDURE — 87086 URINE CULTURE/COLONY COUNT: CPT

## 2020-06-27 PROCEDURE — 81025 URINE PREGNANCY TEST: CPT | Performed by: FAMILY MEDICINE

## 2020-06-27 PROCEDURE — 81002 URINALYSIS NONAUTO W/O SCOPE: CPT | Performed by: FAMILY MEDICINE

## 2020-06-27 PROCEDURE — 99212 OFFICE O/P EST SF 10 MIN: CPT

## 2020-06-27 PROCEDURE — 87186 SC STD MICRODIL/AGAR DIL: CPT

## 2020-06-27 PROCEDURE — 87088 URINE BACTERIA CULTURE: CPT

## 2020-06-27 RX ORDER — OXYCODONE HYDROCHLORIDE 30 MG/1
30 TABLET ORAL EVERY 6 HOURS PRN
COMMUNITY
End: 2022-10-04

## 2020-06-27 RX ORDER — OXYCODONE HYDROCHLORIDE 30 MG/1
30 TABLET, FILM COATED, EXTENDED RELEASE ORAL EVERY 12 HOURS
COMMUNITY
Start: 2020-06-10 | End: 2022-10-04

## 2020-06-27 RX ORDER — NITROFURANTOIN 25; 75 MG/1; MG/1
100 CAPSULE ORAL 2 TIMES DAILY
Qty: 10 CAPSULE | Refills: 0 | Status: SHIPPED | OUTPATIENT
Start: 2020-06-27 | End: 2020-07-02

## 2020-06-27 RX ORDER — DULOXETIN HYDROCHLORIDE 60 MG/1
1 CAPSULE, DELAYED RELEASE ORAL
COMMUNITY
Start: 2020-05-28 | End: 2023-02-09 | Stop reason: ALTCHOICE

## 2020-06-27 RX ORDER — FLUCONAZOLE 150 MG/1
150 TABLET ORAL ONCE
Qty: 1 TABLET | Refills: 0 | Status: SHIPPED | OUTPATIENT
Start: 2020-06-27 | End: 2020-06-27

## 2020-06-27 RX ORDER — CLONAZEPAM 1 MG/1
1 TABLET ORAL 2 TIMES DAILY
COMMUNITY
Start: 2020-06-04

## 2020-06-27 RX ORDER — TIZANIDINE 4 MG/1
TABLET ORAL
COMMUNITY
Start: 2020-06-11 | End: 2020-08-01 | Stop reason: ALTCHOICE

## 2020-06-27 ASSESSMENT — ENCOUNTER SYMPTOMS
HEADACHES: 0
FEVER: 0
DIZZINESS: 0
VOMITING: 0
ABDOMINAL PAIN: 0
NAUSEA: 0
CHILLS: 0
SHORTNESS OF BREATH: 0

## 2020-06-27 ASSESSMENT — PAIN SCALES - GENERAL: PAINLEVEL: 0

## 2020-06-29 LAB
BACTERIA UR CULT: ABNORMAL
ORGANISM: ABNORMAL
REPORT STATUS (RPT): ABNORMAL
SPECIMEN SOURCE: ABNORMAL

## 2020-08-01 ENCOUNTER — WALK IN (OUTPATIENT)
Dept: URGENT CARE | Age: 40
End: 2020-08-01
Attending: FAMILY MEDICINE

## 2020-08-01 DIAGNOSIS — N30.01 ACUTE CYSTITIS WITH HEMATURIA: ICD-10-CM

## 2020-08-01 DIAGNOSIS — R30.0 DYSURIA: Primary | ICD-10-CM

## 2020-08-01 LAB
APPEARANCE, POC: ABNORMAL
B-HCG UR QL: NEGATIVE
BILIRUBIN, POC: NEGATIVE
COLOR, POC: YELLOW
GLUCOSE UR-MCNC: NEGATIVE MG/DL
KETONES, POC: NEGATIVE
NITRITE, POC: NEGATIVE
OCCULT BLOOD, POC: ABNORMAL
PH UR: 8.5 [PH] (ref 5–7)
PROT UR-MCNC: ABNORMAL G/DL
SP GR UR: 1.02 (ref 1–1.03)
UROBILINOGEN UR-MCNC: 0.2 MG/DL (ref 0–1)
WBC (LEUKOCYTE) ESTERASE, POC: ABNORMAL

## 2020-08-01 PROCEDURE — 87088 URINE BACTERIA CULTURE: CPT

## 2020-08-01 PROCEDURE — 81002 URINALYSIS NONAUTO W/O SCOPE: CPT

## 2020-08-01 PROCEDURE — 99212 OFFICE O/P EST SF 10 MIN: CPT

## 2020-08-01 PROCEDURE — 87086 URINE CULTURE/COLONY COUNT: CPT

## 2020-08-01 PROCEDURE — 81025 URINE PREGNANCY TEST: CPT

## 2020-08-01 PROCEDURE — 87186 SC STD MICRODIL/AGAR DIL: CPT

## 2020-08-01 RX ORDER — CIPROFLOXACIN 500 MG/1
500 TABLET, FILM COATED ORAL 2 TIMES DAILY
Qty: 6 TABLET | Refills: 0 | Status: SHIPPED | OUTPATIENT
Start: 2020-08-01 | End: 2020-08-04

## 2020-08-01 RX ORDER — PHENAZOPYRIDINE HYDROCHLORIDE 200 MG/1
200 TABLET, FILM COATED ORAL 3 TIMES DAILY PRN
Qty: 9 TABLET | Refills: 0 | Status: SHIPPED | OUTPATIENT
Start: 2020-08-01 | End: 2023-02-09 | Stop reason: ALTCHOICE

## 2020-08-01 ASSESSMENT — PAIN SCALES - GENERAL: PAINLEVEL: 0

## 2020-09-12 ENCOUNTER — WALK IN (OUTPATIENT)
Dept: URGENT CARE | Age: 40
End: 2020-09-12
Attending: EMERGENCY MEDICINE

## 2020-09-12 DIAGNOSIS — K04.7 DENTAL INFECTION: Primary | ICD-10-CM

## 2020-09-12 PROCEDURE — 99212 OFFICE O/P EST SF 10 MIN: CPT

## 2020-09-12 RX ORDER — FLUCONAZOLE 150 MG/1
150 TABLET ORAL ONCE
Qty: 2 TABLET | Refills: 0 | Status: SHIPPED | OUTPATIENT
Start: 2020-09-12 | End: 2020-09-12

## 2020-09-12 RX ORDER — CLINDAMYCIN HYDROCHLORIDE 300 MG/1
300 CAPSULE ORAL 4 TIMES DAILY
Qty: 28 CAPSULE | Refills: 0 | Status: SHIPPED | OUTPATIENT
Start: 2020-09-12 | End: 2020-09-19

## 2020-09-12 SDOH — HEALTH STABILITY: MENTAL HEALTH: HOW OFTEN DO YOU HAVE A DRINK CONTAINING ALCOHOL?: NEVER

## 2020-09-12 ASSESSMENT — PAIN SCALES - GENERAL: PAINLEVEL: 7-8

## 2021-01-01 ENCOUNTER — EXTERNAL RECORD (OUTPATIENT)
Dept: OTHER | Age: 41
End: 2021-01-01

## 2021-05-26 VITALS
DIASTOLIC BLOOD PRESSURE: 78 MMHG | TEMPERATURE: 98.6 F | HEART RATE: 94 BPM | HEART RATE: 88 BPM | HEIGHT: 60 IN | RESPIRATION RATE: 18 BRPM | WEIGHT: 110 LBS | HEIGHT: 60 IN | BODY MASS INDEX: 22.58 KG/M2 | TEMPERATURE: 96.6 F | OXYGEN SATURATION: 100 % | BODY MASS INDEX: 23.56 KG/M2 | HEIGHT: 60 IN | WEIGHT: 115 LBS | SYSTOLIC BLOOD PRESSURE: 140 MMHG | RESPIRATION RATE: 16 BRPM | SYSTOLIC BLOOD PRESSURE: 116 MMHG | RESPIRATION RATE: 16 BRPM | HEART RATE: 65 BPM | OXYGEN SATURATION: 96 % | BODY MASS INDEX: 21.6 KG/M2 | OXYGEN SATURATION: 98 % | HEART RATE: 96 BPM | TEMPERATURE: 98 F | BODY MASS INDEX: 22.58 KG/M2 | DIASTOLIC BLOOD PRESSURE: 79 MMHG | HEIGHT: 60 IN | TEMPERATURE: 98.8 F | DIASTOLIC BLOOD PRESSURE: 77 MMHG | DIASTOLIC BLOOD PRESSURE: 95 MMHG | RESPIRATION RATE: 16 BRPM | SYSTOLIC BLOOD PRESSURE: 136 MMHG | OXYGEN SATURATION: 99 % | SYSTOLIC BLOOD PRESSURE: 111 MMHG | WEIGHT: 120 LBS | WEIGHT: 115 LBS

## 2021-07-27 ENCOUNTER — TELEPHONE (OUTPATIENT)
Dept: PAIN MANAGEMENT | Age: 41
End: 2021-07-27

## 2021-07-29 ENCOUNTER — TELEPHONE (OUTPATIENT)
Dept: PAIN MANAGEMENT | Age: 41
End: 2021-07-29

## 2021-09-27 ENCOUNTER — NURSE ONLY (OUTPATIENT)
Dept: URGENT CARE | Age: 41
End: 2021-09-27
Attending: INTERNAL MEDICINE

## 2021-09-27 DIAGNOSIS — F90.0 ATTENTION DEFICIT HYPERACTIVITY DISORDER, INATTENTIVE TYPE: ICD-10-CM

## 2021-09-27 DIAGNOSIS — Z79.899 ENCOUNTER FOR LONG-TERM (CURRENT) USE OF OTHER MEDICATIONS: ICD-10-CM

## 2021-09-27 DIAGNOSIS — Z79.899 ENCOUNTER FOR LONG-TERM (CURRENT) USE OF OTHER MEDICATIONS: Primary | ICD-10-CM

## 2021-09-28 LAB
ATRIAL RATE (BPM): 61
P AXIS (DEGREES): 76
PR-INTERVAL (MSEC): 144
QRS-INTERVAL (MSEC): 72
QT-INTERVAL (MSEC): 424
QTC: 427
R AXIS (DEGREES): 25
REPORT TEXT: NORMAL
T AXIS (DEGREES): 38
VENTRICULAR RATE EKG/MIN (BPM): 61

## 2021-11-24 ENCOUNTER — CLINICAL ABSTRACT (OUTPATIENT)
Dept: HEALTH INFORMATION MANAGEMENT | Facility: OTHER | Age: 41
End: 2021-11-24

## 2022-09-06 ENCOUNTER — HOSPITAL ENCOUNTER (OUTPATIENT)
Dept: GENERAL RADIOLOGY | Age: 42
Discharge: HOME | End: 2022-09-06
Attending: FAMILY MEDICINE

## 2022-09-06 ENCOUNTER — WALK IN (OUTPATIENT)
Dept: URGENT CARE | Age: 42
End: 2022-09-06
Attending: FAMILY MEDICINE

## 2022-09-06 VITALS
DIASTOLIC BLOOD PRESSURE: 66 MMHG | RESPIRATION RATE: 14 BRPM | TEMPERATURE: 99 F | OXYGEN SATURATION: 100 % | SYSTOLIC BLOOD PRESSURE: 107 MMHG | BODY MASS INDEX: 21.6 KG/M2 | WEIGHT: 110 LBS | HEIGHT: 60 IN | HEART RATE: 104 BPM

## 2022-09-06 DIAGNOSIS — R21 RASH AND NONSPECIFIC SKIN ERUPTION: Primary | ICD-10-CM

## 2022-09-06 DIAGNOSIS — R13.10 DYSPHAGIA, UNSPECIFIED TYPE: ICD-10-CM

## 2022-09-06 DIAGNOSIS — R21 RASH AND NONSPECIFIC SKIN ERUPTION: ICD-10-CM

## 2022-09-06 PROCEDURE — 10002803 HB RX 637: Performed by: FAMILY MEDICINE

## 2022-09-06 PROCEDURE — 99213 OFFICE O/P EST LOW 20 MIN: CPT

## 2022-09-06 PROCEDURE — 70360 X-RAY EXAM OF NECK: CPT

## 2022-09-06 RX ORDER — PREDNISONE 20 MG/1
60 TABLET ORAL ONCE
Status: COMPLETED | OUTPATIENT
Start: 2022-09-06 | End: 2022-09-06

## 2022-09-06 RX ORDER — DEXTROAMPHETAMINE SACCHARATE, AMPHETAMINE ASPARTATE, DEXTROAMPHETAMINE SULFATE AND AMPHETAMINE SULFATE 5; 5; 5; 5 MG/1; MG/1; MG/1; MG/1
TABLET ORAL
COMMUNITY
Start: 2022-08-30

## 2022-09-06 RX ORDER — ESCITALOPRAM OXALATE 5 MG/1
5 TABLET ORAL DAILY
COMMUNITY
Start: 2022-08-30 | End: 2022-10-04

## 2022-09-06 RX ORDER — DIPHENHYDRAMINE HCL 25 MG
25 CAPSULE ORAL ONCE
Status: COMPLETED | OUTPATIENT
Start: 2022-09-06 | End: 2022-09-06

## 2022-09-06 RX ORDER — CARIPRAZINE 6 MG/1
CAPSULE, GELATIN COATED ORAL
COMMUNITY
Start: 2022-08-18

## 2022-09-06 RX ORDER — TIZANIDINE 4 MG/1
TABLET ORAL EVERY 8 HOURS SCHEDULED
COMMUNITY
End: 2023-02-09 | Stop reason: ALTCHOICE

## 2022-09-06 RX ORDER — METHYLPREDNISOLONE 4 MG
TABLET, DOSE PACK ORAL
Qty: 21 TABLET | Refills: 0 | Status: SHIPPED | OUTPATIENT
Start: 2022-09-06 | End: 2022-10-04

## 2022-09-06 RX ORDER — CLONAZEPAM 1 MG/1
TABLET ORAL EVERY 12 HOURS
COMMUNITY
End: 2022-10-04

## 2022-09-06 RX ORDER — BACLOFEN 10 MG/1
10 TABLET ORAL 3 TIMES DAILY PRN
COMMUNITY
Start: 2022-08-03 | End: 2022-10-04

## 2022-09-06 RX ORDER — LAMOTRIGINE 25 MG/1
TABLET ORAL
COMMUNITY
Start: 2022-07-19 | End: 2022-10-04

## 2022-09-06 RX ORDER — LAMOTRIGINE 200 MG/1
TABLET ORAL
COMMUNITY
Start: 2022-07-19 | End: 2023-02-09 | Stop reason: ALTCHOICE

## 2022-09-06 RX ADMIN — DIPHENHYDRAMINE HYDROCHLORIDE 25 MG: 25 CAPSULE ORAL at 14:52

## 2022-09-06 RX ADMIN — PREDNISONE 60 MG: 20 TABLET ORAL at 14:52

## 2022-09-06 ASSESSMENT — ENCOUNTER SYMPTOMS
ABDOMINAL PAIN: 0
COUGH: 0
DIARRHEA: 0
SORE THROAT: 0
CHILLS: 0
FEVER: 0
HEADACHES: 0
SHORTNESS OF BREATH: 0
NAUSEA: 0
VOMITING: 0
DIZZINESS: 0
BLURRED VISION: 0

## 2022-09-06 ASSESSMENT — PAIN SCALES - GENERAL: PAINLEVEL: 0

## 2022-10-04 PROBLEM — G89.4 CHRONIC PAIN DISORDER: Status: ACTIVE | Noted: 2019-07-22

## 2022-10-04 PROBLEM — G43.019 INTRACTABLE MIGRAINE WITHOUT AURA AND WITHOUT STATUS MIGRAINOSUS: Status: ACTIVE | Noted: 2019-07-30

## 2022-10-04 PROBLEM — M48.02 SPINAL STENOSIS IN CERVICAL REGION: Status: ACTIVE | Noted: 2022-02-28

## 2022-10-04 PROBLEM — F41.9 ANXIETY DISORDER: Status: ACTIVE | Noted: 2019-07-22

## 2023-02-08 ENCOUNTER — TELEPHONE (OUTPATIENT)
Dept: SCHEDULING | Age: 43
End: 2023-02-08

## 2023-06-05 ENCOUNTER — WALK IN (OUTPATIENT)
Dept: URGENT CARE | Age: 43
End: 2023-06-05
Attending: EMERGENCY MEDICINE

## 2023-06-05 ENCOUNTER — HOSPITAL ENCOUNTER (OUTPATIENT)
Dept: GENERAL RADIOLOGY | Age: 43
Discharge: HOME | End: 2023-06-05
Attending: NURSE PRACTITIONER

## 2023-06-05 VITALS
HEIGHT: 60 IN | HEART RATE: 100 BPM | DIASTOLIC BLOOD PRESSURE: 85 MMHG | WEIGHT: 115 LBS | BODY MASS INDEX: 22.58 KG/M2 | SYSTOLIC BLOOD PRESSURE: 121 MMHG | TEMPERATURE: 97.9 F | OXYGEN SATURATION: 100 % | RESPIRATION RATE: 16 BRPM

## 2023-06-05 DIAGNOSIS — T14.90XA INJURY: ICD-10-CM

## 2023-06-05 DIAGNOSIS — S93.602A SPRAIN OF LEFT FOOT, INITIAL ENCOUNTER: ICD-10-CM

## 2023-06-05 DIAGNOSIS — T14.90XA INJURY: Primary | ICD-10-CM

## 2023-06-05 PROCEDURE — 99213 OFFICE O/P EST LOW 20 MIN: CPT

## 2023-06-05 PROCEDURE — 73630 X-RAY EXAM OF FOOT: CPT

## 2023-06-05 ASSESSMENT — ENCOUNTER SYMPTOMS
VOMITING: 0
DIARRHEA: 0
EYE PAIN: 0
WEIGHT LOSS: 0
DIAPHORESIS: 0
DIZZINESS: 0
ABDOMINAL PAIN: 0
SORE THROAT: 0
FEVER: 0
NERVOUS/ANXIOUS: 0
NAUSEA: 0
HEADACHES: 0
DEPRESSION: 0
COUGH: 0
SHORTNESS OF BREATH: 0
WEAKNESS: 0
CHILLS: 0

## 2023-06-05 ASSESSMENT — PAIN SCALES - GENERAL: PAINLEVEL: 5

## 2023-06-13 ENCOUNTER — HOSPITAL ENCOUNTER (OUTPATIENT)
Dept: GENERAL RADIOLOGY | Age: 43
Discharge: HOME | End: 2023-06-13
Attending: FAMILY MEDICINE

## 2023-06-13 ENCOUNTER — WALK IN (OUTPATIENT)
Dept: URGENT CARE | Age: 43
End: 2023-06-13
Attending: FAMILY MEDICINE

## 2023-06-13 VITALS
TEMPERATURE: 98.2 F | RESPIRATION RATE: 16 BRPM | OXYGEN SATURATION: 99 % | SYSTOLIC BLOOD PRESSURE: 112 MMHG | HEIGHT: 60 IN | WEIGHT: 115 LBS | DIASTOLIC BLOOD PRESSURE: 76 MMHG | BODY MASS INDEX: 22.58 KG/M2 | HEART RATE: 99 BPM

## 2023-06-13 DIAGNOSIS — R07.81 RIB PAIN ON RIGHT SIDE: ICD-10-CM

## 2023-06-13 DIAGNOSIS — R07.81 RIB PAIN ON RIGHT SIDE: Primary | ICD-10-CM

## 2023-06-13 PROCEDURE — 71101 X-RAY EXAM UNILAT RIBS/CHEST: CPT

## 2023-06-13 PROCEDURE — 99212 OFFICE O/P EST SF 10 MIN: CPT

## 2023-06-13 ASSESSMENT — PAIN SCALES - GENERAL: PAINLEVEL: 4

## 2024-02-06 PROBLEM — M06.30: Status: ACTIVE | Noted: 2024-02-06

## 2024-02-06 PROBLEM — R11.10 VOMITING: Status: ACTIVE | Noted: 2024-02-06

## 2024-02-06 PROBLEM — R22.1 NODULE OF NECK: Status: ACTIVE | Noted: 2024-02-06

## 2024-02-06 PROBLEM — Z80.3 FAMILY HISTORY OF BREAST CANCER: Status: ACTIVE | Noted: 2024-02-06

## 2024-02-06 PROBLEM — F17.200 NICOTINE DEPENDENCE: Status: ACTIVE | Noted: 2019-07-22

## 2024-02-06 PROBLEM — N63.12 UNSPECIFIED LUMP IN THE RIGHT BREAST, UPPER INNER QUADRANT: Status: ACTIVE | Noted: 2020-09-30

## 2024-02-06 PROBLEM — R94.31 ABNORMAL EKG: Status: ACTIVE | Noted: 2019-07-22

## 2024-02-06 PROBLEM — N13.30 HYDRONEPHROSIS OF RIGHT KIDNEY: Status: ACTIVE | Noted: 2019-07-30

## 2024-02-06 PROBLEM — M06.9 RHEUMATOID ARTHRITIS INVOLVING MULTIPLE SITES  (CMD): Status: ACTIVE | Noted: 2019-07-30

## 2024-05-10 ENCOUNTER — E-ADVICE (OUTPATIENT)
Dept: OTHER | Age: 44
End: 2024-05-10

## 2025-09-06 ENCOUNTER — WALK IN (OUTPATIENT)
Dept: URGENT CARE | Age: 45
End: 2025-09-06
Attending: FAMILY MEDICINE

## 2025-09-06 ENCOUNTER — HOSPITAL ENCOUNTER (OUTPATIENT)
Dept: GENERAL RADIOLOGY | Age: 45
End: 2025-09-06
Attending: FAMILY MEDICINE

## 2025-09-06 VITALS
TEMPERATURE: 97.9 F | SYSTOLIC BLOOD PRESSURE: 139 MMHG | DIASTOLIC BLOOD PRESSURE: 88 MMHG | OXYGEN SATURATION: 97 % | RESPIRATION RATE: 16 BRPM | HEART RATE: 98 BPM

## 2025-09-06 DIAGNOSIS — J98.01 BRONCHOSPASM: ICD-10-CM

## 2025-09-06 DIAGNOSIS — R05.1 ACUTE COUGH: Primary | ICD-10-CM

## 2025-09-06 DIAGNOSIS — R05.1 ACUTE COUGH: ICD-10-CM

## 2025-09-06 PROCEDURE — 71046 X-RAY EXAM CHEST 2 VIEWS: CPT

## 2025-09-06 RX ORDER — BENZONATATE 100 MG/1
100 CAPSULE ORAL 3 TIMES DAILY PRN
Qty: 30 CAPSULE | Refills: 0 | Status: SHIPPED | OUTPATIENT
Start: 2025-09-06 | End: 2025-09-16

## 2025-09-06 RX ORDER — METHYLPREDNISOLONE 4 MG
TABLET, DOSE PACK ORAL
Qty: 21 TABLET | Refills: 0 | Status: SHIPPED | OUTPATIENT
Start: 2025-09-06 | End: 2025-09-12

## 2025-09-06 RX ORDER — ALBUTEROL SULFATE 90 UG/1
2 INHALANT RESPIRATORY (INHALATION) EVERY 4 HOURS PRN
Qty: 1 EACH | Refills: 0 | Status: SHIPPED | OUTPATIENT
Start: 2025-09-06